# Patient Record
Sex: FEMALE | Race: WHITE | NOT HISPANIC OR LATINO | URBAN - METROPOLITAN AREA
[De-identification: names, ages, dates, MRNs, and addresses within clinical notes are randomized per-mention and may not be internally consistent; named-entity substitution may affect disease eponyms.]

---

## 2023-07-18 ENCOUNTER — TELEPHONE (OUTPATIENT)
Dept: PRIMARY CARE | Facility: CLINIC | Age: 58
End: 2023-07-18
Payer: COMMERCIAL

## 2023-07-18 DIAGNOSIS — R10.84 GENERALIZED ABDOMINAL PAIN: Primary | ICD-10-CM

## 2024-03-07 ENCOUNTER — TELEPHONE (OUTPATIENT)
Dept: PRIMARY CARE | Facility: CLINIC | Age: 59
End: 2024-03-07
Payer: COMMERCIAL

## 2024-03-07 DIAGNOSIS — Z12.31 ENCOUNTER FOR SCREENING MAMMOGRAM FOR MALIGNANT NEOPLASM OF BREAST: Primary | ICD-10-CM

## 2024-03-15 ENCOUNTER — TELEPHONE (OUTPATIENT)
Dept: GASTROENTEROLOGY | Facility: CLINIC | Age: 59
End: 2024-03-15
Payer: COMMERCIAL

## 2024-03-15 NOTE — TELEPHONE ENCOUNTER
Attempted to speak with patient in regards to rescheduling office visit. VM left for patient to return call to office at earliest convenience.

## 2024-03-18 ENCOUNTER — TELEPHONE (OUTPATIENT)
Dept: GASTROENTEROLOGY | Facility: CLINIC | Age: 59
End: 2024-03-18
Payer: COMMERCIAL

## 2024-03-18 NOTE — TELEPHONE ENCOUNTER
Attempted to speak with patient in regards to rescheduling clinic appointment. VM left for patient to return call to office at earliest convenience.

## 2024-03-27 ENCOUNTER — APPOINTMENT (OUTPATIENT)
Dept: RADIOLOGY | Facility: CLINIC | Age: 59
End: 2024-03-27
Payer: COMMERCIAL

## 2024-03-27 ENCOUNTER — HOSPITAL ENCOUNTER (OUTPATIENT)
Dept: RADIOLOGY | Facility: CLINIC | Age: 59
Discharge: HOME | End: 2024-03-27
Payer: COMMERCIAL

## 2024-03-27 VITALS — BODY MASS INDEX: 23 KG/M2 | HEIGHT: 62 IN | WEIGHT: 125 LBS

## 2024-03-27 DIAGNOSIS — Z12.31 ENCOUNTER FOR SCREENING MAMMOGRAM FOR MALIGNANT NEOPLASM OF BREAST: ICD-10-CM

## 2024-03-27 DIAGNOSIS — Z12.31 SCREENING MAMMOGRAM FOR BREAST CANCER: ICD-10-CM

## 2024-03-27 PROCEDURE — 77063 BREAST TOMOSYNTHESIS BI: CPT

## 2024-03-27 PROCEDURE — 77063 BREAST TOMOSYNTHESIS BI: CPT | Performed by: RADIOLOGY

## 2024-03-27 PROCEDURE — 77067 SCR MAMMO BI INCL CAD: CPT | Performed by: RADIOLOGY

## 2024-03-27 NOTE — PROGRESS NOTES
Subjective   Patient ID: Sarthak Meneses is a 58 y.o. female who presents for Constipation (Pt states having chronic constipation for the last 20 + years. Experiences a lot of bloating, states that it takes a lot of time to struggle to have bowel movement. Has tried OTC medications with no relief. ).  HPI  Patient with longstanding depression and on multiple medications.  Also has longstanding issues with constipation which was previously treated with high-dose with over-the-counter laxative therapies.  She is seen in the office today to establish care for constipation management.  Last colonoscopy was more than 3 years ago it was a poor prep was recommended repeat in 1 year.  BM: once in 2 weeks if she does not take laxatives.   > 20 years.   Has been taking Stimulant laxatives >> for a long period of time.   She weaned o  She is on Many anti-depressants.   Current Outpatient Medications on File Prior to Visit   Medication Sig Dispense Refill    amitriptyline (Elavil) 150 mg tablet Take 1 tablet (150 mg) by mouth once daily at bedtime.      ammonium lactate (Amlactin) 12 % cream Apply topically.      bimatoprost (Latisse) 0.03 % ophthalmic solution PLACE 1 DROP ON APPLICATOR & APPLY EVENLY ALONG BOTH OF THE UPPER EYELIDS AT BASE OF EYELASHES DAILY      estradiol (Estrace) 1 mg tablet Take 1 tablet (1 mg) by mouth once daily.      levothyroxine (Synthroid, Levoxyl) 50 mcg tablet 1 tablet (50 mcg).      LORazepam (Ativan) 1 mg tablet Take 1 tablet (1 mg) by mouth once daily at bedtime.      minoxidil (Loniten) 2.5 mg tablet Take 0.5 tablets (1.25 mg) by mouth once daily.      nortriptyline (Pamelor) 75 mg capsule Take 2 capsules (150 mg) by mouth once daily at bedtime.      progesterone (Prometrium) 100 mg capsule       spironolactone (Aldactone) 100 mg tablet 200825 Medication spironolactone 100 mg tablet Aldactone 100 mg 3/3/2021 Active (Outside)      tranexamic acid (Lysteda) 650 mg tablet tablet 1 TAB  "DAILY UNTIL MELASMA IMPROVED THEN EVERY YEAR MAY-SEPTEMBER AND DURING JEFFRY VACATION       No current facility-administered medications on file prior to visit.        Review of Systems   Constitutional: Negative.    Respiratory: Negative.     Cardiovascular: Negative.    Gastrointestinal:  Positive for constipation.   Endocrine: Negative.    Genitourinary: Negative.    Skin: Negative.    Neurological: Negative.        Objective   Physical Exam  Vitals reviewed.   Constitutional:       General: She is awake.      Appearance: Normal appearance.   HENT:      Head: Normocephalic and atraumatic.      Nose: Nose normal.      Mouth/Throat:      Mouth: Mucous membranes are moist.   Eyes:      Pupils: Pupils are equal, round, and reactive to light.   Cardiovascular:      Rate and Rhythm: Normal rate.   Pulmonary:      Effort: Pulmonary effort is normal.   Neurological:      Mental Status: She is alert and oriented to person, place, and time. Mental status is at baseline.   Psychiatric:         Attention and Perception: Attention and perception normal.         Mood and Affect: Mood normal.         Behavior: Behavior normal.       /86 (BP Location: Right arm, Patient Position: Sitting, BP Cuff Size: Adult)   Pulse 88   Resp 16   Ht 1.575 m (5' 2\")   Wt 56.4 kg (124 lb 4.8 oz)   SpO2 98%   BMI 22.73 kg/m²      Lab Results   Component Value Date    WBC 3.5 (L) 11/25/2020    HGB 13.5 11/25/2020    HCT 42.0 11/25/2020    MCV 98 11/25/2020     11/25/2020           No lab exists for component: \"LABALBU\"    No results found for: \"AFP\"  Lab Results   Component Value Date    TSH 2.04 09/25/2021     Colonoscopy  Order# [id number]  Reading physician: Barry Kong MD Ordering physician: David Youssef DO Study date: 01/06/2023     Result Information    Status: Edited Result - FINAL (Collected: 1/7/2021 14:03) Provider Status: Ordered     Result Text    Result Text   Patient Name: Sarthak Meneses  Procedure Date: 1/7/2021 " 2:03 PM  MRN: [id number]  Account Number: [id number]  YOB: 1965  Admit Type: Outpatient  Site: [facility] Room 2  Ethnicity: Not  or   Race: White  Attending MD: Barry Kong MD, [id number]  Procedure:             Colonoscopy  Indications:           Screening for malignant neoplasm in the colon, This is                          the patient's first colonoscopy  Providers:             Barry Kong MD (Doctor) Gastroenterology,                          Leighton Mortensen (Nurse) , Jennifer Yan, Technician  Referring:             David Youssef DO  Medicines:             Propofol per Anesthesia  Complications:         No immediate complications. Estimated blood loss: None.  Procedure:             Pre-Anesthesia Assessment:                         - Prior to the procedure, a History and Physical was                          performed, and patient medications and allergies were                          reviewed. The patient is competent. The risks and                          benefits of the procedure and the sedation options and                          risks were discussed with the patient. All questions                          were answered and informed consent was obtained.                          Patient identification and proposed procedure were                          verified by the physician, the nurse, the anesthetist                          and the technician in the pre-procedure area in the                          procedure room. Mental Status Examination: alert and                          oriented. Airway Examination: normal oropharyngeal                          airway and neck mobility. Respiratory Examination:                          clear to auscultation. CV Examination: normal.                          Prophylactic Antibiotics: The patient does not require                          prophylactic antibiotics. Prior Anticoagulants: The                           patient has taken no previous anticoagulant or                          antiplatelet agents. ASA Grade Assessment: II - A                          patient with mild systemic disease. After reviewing                          the risks and benefits, the patient was deemed in                          satisfactory condition to undergo the procedure. The                          anesthesia plan was to use monitored anesthesia care                          (MAC). Immediately prior to administration of                          medications, the patient was re-assessed for adequacy                          to receive sedatives. The heart rate, respiratory                          rate, oxygen saturations, blood pressure, adequacy of                          pulmonary ventilation, and response to care were                          monitored throughout the procedure. The physical                          status of the patient was re-assessed after the                          procedure.                         After I obtained informed consent, the scope was                          passed under direct vision. Throughout the procedure,                          the patient's blood pressure, pulse, and oxygen                          saturations were monitored continuously. The pediatric                          colonoscope was introduced through the anus and                          advanced to the transverse colon. The colonoscopy was                          performed with ease. The patient tolerated the                          procedure well. The quality of the bowel preparation                          was evaluated using the BBPS ([place] Bowel Preparation                          Scale) with scores of: Right Colon = 0 (unprepared,                          mucosa not seen due to solid stool that cannot be                          cleared or unseen proximal colon segment in a                          colonoscopy aborted due  to inadequate bowel prep),                          Transverse Colon = 1 (portion of mucosa seen, but                          other areas not well seen due to staining, residual                          stool and/or opaque liquid) and Left Colon = 1                          (portion of mucosa seen, but other areas not well seen                          due to staining, residual stool and/or opaque liquid).                          The total BBPS score equals 2. The quality of the                          bowel preparation was inadequate. The rectum was                          photographed.  Findings:       The perianal and digital rectal examinations were normal.       Copious quantities of semi-solid stool was found in the entire colon,        precluding visualization. Lavage of the area was performed using copious        amounts of tap water, resulting in incomplete clearance with continued        poor visualization.       The exam was otherwise normal throughout the examined colon.  Estimated Blood Loss:       Estimated blood loss: none.  Impression:            - Preparation of the colon was inadequate.                         - Stool in the entire examined colon.                         - Otherwise normal limited colonic examination.  Recommendation:        - Discharge patient to home.                         - Resume previous diet.                         - Continue present medications.                         - Repeat colonoscopy in 1 year because the bowel                          preparation was poor using a two day preparation.                         - Return to referring physician.                         - Patient has a contact number available for                          emergencies. The signs and symptoms of potential                          delayed complications were discussed with the patient.                          Return to normal activities tomorrow. Written                           discharge instructions were provided to the patient.  Procedure Code(s):     --- Professional ---                         28301, 53, Colonoscopy, flexible; diagnostic,                          including collection of specimen(s) by brushing or                          washing, when performed (separate procedure)  Diagnosis Code(s):     --- Professional ---                         Z12.11, Encounter for screening for malignant neoplasm                          of colon  CPT copyright 2021 [employer]. All rights reserved.  The codes documented in this report are preliminary and upon  review may   be revised to meet current compliance requirements.  Barry Kong MD  1/7/2021 2:34:19 PM  This report has been signed electronically.  Number of Addenda: 0  Note Initiated On: 1/7/2021 2:03 PM  Total Procedure Duration Time 0 hours 12 minutes 24 second           Assessment/Plan   Diagnoses and all orders for this visit:  Chronic idiopathic constipation  -     lubiprostone (Amitiza) 24 mcg capsule; Take 1 capsule (24 mcg) by mouth 2 times a day with meals. Take with meals  Colon cancer screening  -     Colonoscopy Screening; Average Risk Patient; Future  -     sodium,potassium,mag sulfates (Suprep) 17.5-3.13-1.6 gram recon soln solution; Take one bottle twice as directed by the prep instructions  2 day prep, day 1 miralax prep , day 2 Suprep.     Continue Miralax 1 capful in 1-2 times a day.   High fiber diet.   Follow up in 3 months.

## 2024-04-03 ENCOUNTER — OFFICE VISIT (OUTPATIENT)
Dept: GASTROENTEROLOGY | Facility: CLINIC | Age: 59
End: 2024-04-03
Payer: COMMERCIAL

## 2024-04-03 VITALS
RESPIRATION RATE: 16 BRPM | HEART RATE: 88 BPM | WEIGHT: 124.3 LBS | SYSTOLIC BLOOD PRESSURE: 126 MMHG | BODY MASS INDEX: 22.87 KG/M2 | DIASTOLIC BLOOD PRESSURE: 86 MMHG | HEIGHT: 62 IN | OXYGEN SATURATION: 98 %

## 2024-04-03 DIAGNOSIS — K59.04 CHRONIC IDIOPATHIC CONSTIPATION: Primary | ICD-10-CM

## 2024-04-03 DIAGNOSIS — Z12.11 COLON CANCER SCREENING: ICD-10-CM

## 2024-04-03 PROCEDURE — 1036F TOBACCO NON-USER: CPT | Performed by: INTERNAL MEDICINE

## 2024-04-03 PROCEDURE — 99204 OFFICE O/P NEW MOD 45 MIN: CPT | Performed by: INTERNAL MEDICINE

## 2024-04-03 RX ORDER — SPIRONOLACTONE 100 MG/1
TABLET, FILM COATED ORAL
COMMUNITY
Start: 2014-09-09

## 2024-04-03 RX ORDER — AMITRIPTYLINE HYDROCHLORIDE 150 MG/1
150 TABLET ORAL NIGHTLY
COMMUNITY

## 2024-04-03 RX ORDER — ESTRADIOL 1 MG/1
1 TABLET ORAL DAILY
COMMUNITY
Start: 2024-02-20

## 2024-04-03 RX ORDER — TRANEXAMIC ACID 650 MG/1
TABLET ORAL
COMMUNITY
Start: 2024-03-21

## 2024-04-03 RX ORDER — LUBIPROSTONE 24 UG/1
24 CAPSULE ORAL
Qty: 60 CAPSULE | Refills: 11 | Status: SHIPPED | OUTPATIENT
Start: 2024-04-03 | End: 2025-04-03

## 2024-04-03 RX ORDER — AMMONIUM LACTATE 12 G/100G
CREAM TOPICAL
COMMUNITY
Start: 2017-12-05

## 2024-04-03 RX ORDER — SODIUM, POTASSIUM,MAG SULFATES 17.5-3.13G
SOLUTION, RECONSTITUTED, ORAL ORAL
Qty: 354 ML | Refills: 0 | Status: SHIPPED | OUTPATIENT
Start: 2024-04-03

## 2024-04-03 RX ORDER — BIMATOPROST 3 UG/ML
SOLUTION TOPICAL
COMMUNITY

## 2024-04-03 RX ORDER — MINOXIDIL 2.5 MG/1
1.25 TABLET ORAL DAILY
COMMUNITY

## 2024-04-03 RX ORDER — NORTRIPTYLINE HYDROCHLORIDE 75 MG/1
150 CAPSULE ORAL NIGHTLY
COMMUNITY

## 2024-04-03 RX ORDER — PROGESTERONE 100 MG/1
CAPSULE ORAL
COMMUNITY
Start: 2024-03-29

## 2024-04-03 RX ORDER — LORAZEPAM 1 MG/1
1 TABLET ORAL NIGHTLY
COMMUNITY

## 2024-04-03 RX ORDER — LEVOTHYROXINE SODIUM 50 UG/1
50 TABLET ORAL
COMMUNITY
Start: 2022-03-19

## 2024-04-03 ASSESSMENT — ENCOUNTER SYMPTOMS
CONSTIPATION: 1
NEUROLOGICAL NEGATIVE: 1
RESPIRATORY NEGATIVE: 1
CARDIOVASCULAR NEGATIVE: 1
ENDOCRINE NEGATIVE: 1
CONSTITUTIONAL NEGATIVE: 1

## 2024-04-03 NOTE — PATIENT INSTRUCTIONS
Chronic idiopathic constipation  -     lubiprostone (Amitiza) 24 mcg capsule; Take 1 capsule (24 mcg) by mouth 2 times a day with meals. Take with meals  Colon cancer screening  -     Colonoscopy Screening; Average Risk Patient; Future  -     sodium,potassium,mag sulfates (Suprep) 17.5-3.13-1.6 gram recon soln solution; Take one bottle twice as directed by the prep instructions  2 day prep, day 1 miralax prep , day 2 Suprep.     Continue Miralax 1 capful in 1-2 times a day.   High fiber diet.   Follow up in 3 months.

## 2024-04-04 ENCOUNTER — APPOINTMENT (OUTPATIENT)
Dept: GASTROENTEROLOGY | Facility: CLINIC | Age: 59
End: 2024-04-04
Payer: COMMERCIAL

## 2024-04-05 ENCOUNTER — TELEPHONE (OUTPATIENT)
Dept: PRIMARY CARE | Facility: CLINIC | Age: 59
End: 2024-04-05
Payer: COMMERCIAL

## 2024-04-05 ENCOUNTER — PATIENT MESSAGE (OUTPATIENT)
Dept: PRIMARY CARE | Facility: CLINIC | Age: 59
End: 2024-04-05
Payer: COMMERCIAL

## 2024-04-05 DIAGNOSIS — R92.333 HETEROGENEOUSLY DENSE TISSUE OF BOTH BREASTS ON MAMMOGRAPHY: Primary | ICD-10-CM

## 2024-04-05 NOTE — TELEPHONE ENCOUNTER
----- Message from David Youssef DO sent at 4/4/2024  8:04 PM EDT -----  Call Sarthak Meneses their mammogram is normal

## 2024-04-23 ENCOUNTER — APPOINTMENT (OUTPATIENT)
Dept: RADIOLOGY | Facility: HOSPITAL | Age: 59
End: 2024-04-23
Payer: COMMERCIAL

## 2024-05-13 ENCOUNTER — HOSPITAL ENCOUNTER (OUTPATIENT)
Dept: RADIOLOGY | Facility: HOSPITAL | Age: 59
Discharge: HOME | End: 2024-05-13
Payer: COMMERCIAL

## 2024-05-13 VITALS — WEIGHT: 126 LBS | HEIGHT: 62 IN | BODY MASS INDEX: 23.19 KG/M2

## 2024-05-13 DIAGNOSIS — R92.333 HETEROGENEOUSLY DENSE TISSUE OF BOTH BREASTS ON MAMMOGRAPHY: ICD-10-CM

## 2024-05-13 PROCEDURE — 76642 ULTRASOUND BREAST LIMITED: CPT | Mod: BILATERAL PROCEDURE | Performed by: RADIOLOGY

## 2024-05-13 PROCEDURE — 76641 ULTRASOUND BREAST COMPLETE: CPT | Mod: 50

## 2024-05-14 ENCOUNTER — TELEPHONE (OUTPATIENT)
Dept: PRIMARY CARE | Facility: CLINIC | Age: 59
End: 2024-05-14
Payer: COMMERCIAL

## 2024-05-14 NOTE — TELEPHONE ENCOUNTER
----- Message from David Youssef DO sent at 5/14/2024  7:35 AM EDT -----  Call Sarthak Meneses their mammogram is normal    
Sent message in [place]  
No

## 2024-06-12 ENCOUNTER — ANESTHESIA EVENT (OUTPATIENT)
Dept: GASTROENTEROLOGY | Facility: EXTERNAL LOCATION | Age: 59
End: 2024-06-12

## 2024-06-24 ENCOUNTER — APPOINTMENT (OUTPATIENT)
Dept: GASTROENTEROLOGY | Facility: EXTERNAL LOCATION | Age: 59
End: 2024-06-24
Payer: COMMERCIAL

## 2024-06-24 ENCOUNTER — ANESTHESIA (OUTPATIENT)
Dept: GASTROENTEROLOGY | Facility: EXTERNAL LOCATION | Age: 59
End: 2024-06-24

## 2024-07-09 NOTE — PROGRESS NOTES
Subjective   Patient ID: Sarthak Meneses is a 59 y.o. female who presents for Follow-up (Patient follows up for constipation. She states she tends to take the amitiza later in the day since she doesn't tend to eat breakfast. /Denies rectal bleeding/mucus. /).  HPI  Is on Amitiza twice daily.   BM : once in 2 days.   Overall symptoms have improved from before.    Current Outpatient Medications on File Prior to Visit   Medication Sig Dispense Refill    amitriptyline (Elavil) 150 mg tablet Take 1 tablet (150 mg) by mouth once daily at bedtime.      bimatoprost (Latisse) 0.03 % ophthalmic solution PLACE 1 DROP ON APPLICATOR & APPLY EVENLY ALONG BOTH OF THE UPPER EYELIDS AT BASE OF EYELASHES DAILY      estradiol (Estrace) 1 mg tablet Take 1 tablet (1 mg) by mouth once daily.      levothyroxine (Synthroid, Levoxyl) 50 mcg tablet 1 tablet (50 mcg).      LORazepam (Ativan) 1 mg tablet Take 1 tablet (1 mg) by mouth once daily at bedtime.      lubiprostone (Amitiza) 24 mcg capsule Take 1 capsule (24 mcg) by mouth 2 times a day with meals. Take with meals 60 capsule 11    minoxidil (Loniten) 2.5 mg tablet Take 0.5 tablets (1.25 mg) by mouth once daily.      nortriptyline (Pamelor) 75 mg capsule Take 2 capsules (150 mg) by mouth once daily at bedtime.      progesterone (Prometrium) 100 mg capsule       sodium,potassium,mag sulfates (Suprep) 17.5-3.13-1.6 gram recon soln solution Take one bottle twice as directed by the prep instructions 354 mL 0    spironolactone (Aldactone) 100 mg tablet 200825 Medication spironolactone 100 mg tablet Aldactone 100 mg 3/3/2021 Active (Outside)      tranexamic acid (Lysteda) 650 mg tablet tablet 1 TAB DAILY UNTIL MELASMA IMPROVED THEN EVERY YEAR MAY-SEPTEMBER AND DURING JEFFRY VACATION      [DISCONTINUED] ammonium lactate (Amlactin) 12 % cream Apply topically.       No current facility-administered medications on file prior to visit.        Review of Systems   Constitutional:  Negative for  chills, fever and unexpected weight change.   HENT:  Negative for congestion and trouble swallowing.    Respiratory:  Negative for cough, shortness of breath and wheezing.    Cardiovascular:  Negative for chest pain.   Gastrointestinal:  Negative for abdominal distention, abdominal pain, constipation, diarrhea, nausea and vomiting.   Genitourinary:  Negative for difficulty urinating.   Musculoskeletal:  Negative for arthralgias and joint swelling.   Skin:  Negative for color change.   Neurological:  Negative for dizziness, speech difficulty, light-headedness and headaches.   Psychiatric/Behavioral:  Negative for confusion and sleep disturbance.        Objective   Physical Exam  Constitutional:       General: She is awake.      Appearance: Normal appearance.   HENT:      Head: Normocephalic and atraumatic.      Nose: Nose normal.      Mouth/Throat:      Mouth: Mucous membranes are moist.   Eyes:      Pupils: Pupils are equal, round, and reactive to light.   Neck:      Thyroid: No thyroid mass.      Trachea: Phonation normal.   Cardiovascular:      Rate and Rhythm: Normal rate and regular rhythm.      Heart sounds: Normal heart sounds. No murmur heard.     No gallop.   Pulmonary:      Effort: Pulmonary effort is normal. No respiratory distress.      Breath sounds: Normal air entry. No decreased breath sounds, wheezing, rhonchi or rales.   Abdominal:      General: Bowel sounds are normal. There is no distension.      Palpations: Abdomen is soft.      Tenderness: There is no abdominal tenderness.   Musculoskeletal:      Cervical back: Neck supple.      Right lower leg: No edema.      Left lower leg: No edema.   Skin:     General: Skin is warm.      Capillary Refill: Capillary refill takes less than 2 seconds.   Neurological:      General: No focal deficit present.      Mental Status: She is alert and oriented to person, place, and time. Mental status is at baseline.      Cranial Nerves: Cranial nerves 2-12 are intact.  "     Motor: Motor function is intact.   Psychiatric:         Attention and Perception: Attention and perception normal.         Mood and Affect: Mood normal.         Speech: Speech normal.         Behavior: Behavior normal.       /71 (BP Location: Left arm, Patient Position: Sitting, BP Cuff Size: Small adult)   Pulse 87   Temp 36.6 °C (97.9 °F) (Temporal)   Ht 1.575 m (5' 2\")   Wt 54.5 kg (120 lb 1.6 oz)   SpO2 99%   BMI 21.97 kg/m²      Lab Results   Component Value Date    WBC 3.5 (L) 11/25/2020    HGB 13.5 11/25/2020    HCT 42.0 11/25/2020    MCV 98 11/25/2020     11/25/2020           No lab exists for component: \"LABALBU\"    No results found for: \"AFP\"  Lab Results   Component Value Date    TSH 2.04 09/25/2021     Colonoscopy  Order# [id number]  Reading physician: Barry Kong MD Ordering provider: David Youssef DO Study date: 01/06/2023     Result Information    Status: Edited Result - FINAL (Collected: 1/7/2021 14:03) Provider Status: Ordered     Result Text    Result Text   Patient Name: Sarthak eMneses  Procedure Date: 1/7/2021 2:03 PM  MRN: [id number]  Account Number: [id number]  YOB: 1965  Admit Type: Outpatient  Site: [facility] Room 2  Ethnicity: Not  or   Race: White  Attending MD: Barry Kong MD, [id number]  Procedure:             Colonoscopy  Indications:           Screening for malignant neoplasm in the colon, This is                          the patient's first colonoscopy  Providers:             Barry Kong MD (Doctor) Gastroenterology,                          Leighton Mortensen (Nurse) , Jennifer Yan, Technician  Referring:             David Youssef DO  Medicines:             Propofol per Anesthesia  Complications:         No immediate complications. Estimated blood loss: None.  Procedure:             Pre-Anesthesia Assessment:                         - Prior to the procedure, a History and Physical was                          " performed, and patient medications and allergies were                          reviewed. The patient is competent. The risks and                          benefits of the procedure and the sedation options and                          risks were discussed with the patient. All questions                          were answered and informed consent was obtained.                          Patient identification and proposed procedure were                          verified by the physician, the nurse, the anesthetist                          and the technician in the pre-procedure area in the                          procedure room. Mental Status Examination: alert and                          oriented. Airway Examination: normal oropharyngeal                          airway and neck mobility. Respiratory Examination:                          clear to auscultation. CV Examination: normal.                          Prophylactic Antibiotics: The patient does not require                          prophylactic antibiotics. Prior Anticoagulants: The                          patient has taken no previous anticoagulant or                          antiplatelet agents. ASA Grade Assessment: II - A                          patient with mild systemic disease. After reviewing                          the risks and benefits, the patient was deemed in                          satisfactory condition to undergo the procedure. The                          anesthesia plan was to use monitored anesthesia care                          (MAC). Immediately prior to administration of                          medications, the patient was re-assessed for adequacy                          to receive sedatives. The heart rate, respiratory                          rate, oxygen saturations, blood pressure, adequacy of                          pulmonary ventilation, and response to care were                          monitored throughout the procedure. The  physical                          status of the patient was re-assessed after the                          procedure.                         After I obtained informed consent, the scope was                          passed under direct vision. Throughout the procedure,                          the patient's blood pressure, pulse, and oxygen                          saturations were monitored continuously. The pediatric                          colonoscope was introduced through the anus and                          advanced to the transverse colon. The colonoscopy was                          performed with ease. The patient tolerated the                          procedure well. The quality of the bowel preparation                          was evaluated using the BBPS ([place] Bowel Preparation                          Scale) with scores of: Right Colon = 0 (unprepared,                          mucosa not seen due to solid stool that cannot be                          cleared or unseen proximal colon segment in a                          colonoscopy aborted due to inadequate bowel prep),                          Transverse Colon = 1 (portion of mucosa seen, but                          other areas not well seen due to staining, residual                          stool and/or opaque liquid) and Left Colon = 1                          (portion of mucosa seen, but other areas not well seen                          due to staining, residual stool and/or opaque liquid).                          The total BBPS score equals 2. The quality of the                          bowel preparation was inadequate. The rectum was                          photographed.  Findings:       The perianal and digital rectal examinations were normal.       Copious quantities of semi-solid stool was found in the entire colon,        precluding visualization. Lavage of the area was performed using copious        amounts of tap water, resulting in  incomplete clearance with continued        poor visualization.       The exam was otherwise normal throughout the examined colon.  Estimated Blood Loss:       Estimated blood loss: none.  Impression:            - Preparation of the colon was inadequate.                         - Stool in the entire examined colon.                         - Otherwise normal limited colonic examination.  Recommendation:        - Discharge patient to home.                         - Resume previous diet.                         - Continue present medications.                         - Repeat colonoscopy in 1 year because the bowel                          preparation was poor using a two day preparation.                         - Return to referring physician.                         - Patient has a contact number available for                          emergencies. The signs and symptoms of potential                          delayed complications were discussed with the patient.                          Return to normal activities tomorrow. Written                          discharge instructions were provided to the patient.  Procedure Code(s):     --- Professional ---                         31962, 53, Colonoscopy, flexible; diagnostic,                          including collection of specimen(s) by brushing or                          washing, when performed (separate procedure)  Diagnosis Code(s):     --- Professional ---                         Z12.11, Encounter for screening for malignant neoplasm                          of colon  CPT copyright 2021 [employer]. All rights reserved.  The codes documented in this report are preliminary and upon  review may   be revised to meet current compliance requirements.  Barry Kong MD  1/7/2021 2:34:19 PM  This report has been signed electronically.  Number of Addenda: 0  Note Initiated On: 1/7/2021 2:03 PM  Total Procedure Duration Time 0 hours 12 minutes 24  seconds             Assessment/Plan   Diagnoses and all orders for this visit:  Chronic idiopathic constipation  Continue Amitiza twice daily.   Complete colonoscopy  Follow up in 1 year or earlier as needed.   Call with questions.

## 2024-07-11 ENCOUNTER — APPOINTMENT (OUTPATIENT)
Dept: GASTROENTEROLOGY | Facility: CLINIC | Age: 59
End: 2024-07-11
Payer: COMMERCIAL

## 2024-07-11 VITALS
OXYGEN SATURATION: 99 % | HEIGHT: 62 IN | BODY MASS INDEX: 22.1 KG/M2 | HEART RATE: 87 BPM | TEMPERATURE: 97.9 F | DIASTOLIC BLOOD PRESSURE: 71 MMHG | WEIGHT: 120.1 LBS | SYSTOLIC BLOOD PRESSURE: 113 MMHG

## 2024-07-11 DIAGNOSIS — K59.04 CHRONIC IDIOPATHIC CONSTIPATION: Primary | ICD-10-CM

## 2024-07-11 PROCEDURE — 3008F BODY MASS INDEX DOCD: CPT | Performed by: INTERNAL MEDICINE

## 2024-07-11 PROCEDURE — 99214 OFFICE O/P EST MOD 30 MIN: CPT | Performed by: INTERNAL MEDICINE

## 2024-07-11 ASSESSMENT — ENCOUNTER SYMPTOMS
JOINT SWELLING: 0
ARTHRALGIAS: 0
SPEECH DIFFICULTY: 0
ABDOMINAL DISTENTION: 0
CONFUSION: 0
COUGH: 0
SLEEP DISTURBANCE: 0
ABDOMINAL PAIN: 0
DIZZINESS: 0
CONSTIPATION: 0
VOMITING: 0
FEVER: 0
NAUSEA: 0
DIFFICULTY URINATING: 0
HEADACHES: 0
CHILLS: 0
UNEXPECTED WEIGHT CHANGE: 0
LIGHT-HEADEDNESS: 0
COLOR CHANGE: 0
SHORTNESS OF BREATH: 0
WHEEZING: 0
TROUBLE SWALLOWING: 0
DIARRHEA: 0

## 2024-07-11 NOTE — PATIENT INSTRUCTIONS
Diagnoses and all orders for this visit:  Chronic idiopathic constipation  Continue Amitiza twice daily.   Complete colonoscopy  Follow up in 1 year or earlier as needed.   Call with questions.

## 2024-07-31 ENCOUNTER — LAB (OUTPATIENT)
Dept: LAB | Facility: LAB | Age: 59
End: 2024-07-31
Payer: COMMERCIAL

## 2024-07-31 ENCOUNTER — APPOINTMENT (OUTPATIENT)
Dept: PRIMARY CARE | Facility: CLINIC | Age: 59
End: 2024-07-31
Payer: COMMERCIAL

## 2024-07-31 VITALS
DIASTOLIC BLOOD PRESSURE: 72 MMHG | TEMPERATURE: 97.1 F | RESPIRATION RATE: 16 BRPM | SYSTOLIC BLOOD PRESSURE: 114 MMHG | HEART RATE: 84 BPM | HEIGHT: 62 IN | BODY MASS INDEX: 22.08 KG/M2 | WEIGHT: 120 LBS

## 2024-07-31 DIAGNOSIS — Z00.00 WELL ADULT EXAM: Primary | ICD-10-CM

## 2024-07-31 DIAGNOSIS — E03.9 HYPOTHYROIDISM, UNSPECIFIED TYPE: ICD-10-CM

## 2024-07-31 DIAGNOSIS — L85.3 XEROSIS CUTIS: ICD-10-CM

## 2024-07-31 DIAGNOSIS — F41.9 ANXIETY: ICD-10-CM

## 2024-07-31 DIAGNOSIS — H81.09 MENIERE'S DISEASE, UNSPECIFIED LATERALITY: ICD-10-CM

## 2024-07-31 DIAGNOSIS — Z13.220 LIPID SCREENING: ICD-10-CM

## 2024-07-31 DIAGNOSIS — Z00.00 WELL ADULT EXAM: ICD-10-CM

## 2024-07-31 DIAGNOSIS — L81.1 MELASMA: ICD-10-CM

## 2024-07-31 PROBLEM — H93.299 ABNORMAL AUDITORY PERCEPTION: Status: ACTIVE | Noted: 2024-07-31

## 2024-07-31 PROBLEM — D22.5 MELANOCYTIC NEVI OF TRUNK: Status: ACTIVE | Noted: 2021-03-03

## 2024-07-31 PROBLEM — D22.30 MELANOCYTIC NEVI OF UNSPECIFIED PART OF FACE: Status: ACTIVE | Noted: 2017-12-05

## 2024-07-31 PROBLEM — R05.9 COUGH: Status: ACTIVE | Noted: 2024-07-31

## 2024-07-31 PROBLEM — R92.333 MAMMOGRAPHIC HETEROGENEOUS DENSITY, BILATERAL BREASTS: Status: ACTIVE | Noted: 2024-07-31

## 2024-07-31 PROBLEM — K59.04 CHRONIC IDIOPATHIC CONSTIPATION: Status: ACTIVE | Noted: 2024-07-31

## 2024-07-31 PROBLEM — D18.01 HEMANGIOMA OF SKIN AND SUBCUTANEOUS TISSUE: Status: ACTIVE | Noted: 2021-03-03
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
## 2024-07-31 PROBLEM — H69.90 ETD (EUSTACHIAN TUBE DYSFUNCTION): Status: ACTIVE | Noted: 2024-07-31

## 2024-07-31 PROBLEM — J06.9 VIRAL UPPER RESPIRATORY TRACT INFECTION: Status: ACTIVE | Noted: 2024-07-31

## 2024-07-31 PROBLEM — H81.03 ENDOLYMPHATIC HYDROPS OF BOTH EARS: Status: ACTIVE | Noted: 2024-07-31

## 2024-07-31 PROBLEM — L82.1 OTHER SEBORRHEIC KERATOSIS: Status: ACTIVE | Noted: 2021-03-03

## 2024-07-31 PROBLEM — H92.09 OTALGIA: Status: ACTIVE | Noted: 2024-07-31

## 2024-07-31 PROBLEM — F32.A DEPRESSION: Status: ACTIVE | Noted: 2024-07-31

## 2024-07-31 PROBLEM — J30.9 ALLERGIC RHINITIS: Status: ACTIVE | Noted: 2024-07-31

## 2024-07-31 PROBLEM — L70.9 ACNE: Status: ACTIVE | Noted: 2021-03-03

## 2024-07-31 PROBLEM — L81.0 POSTINFLAMMATORY HYPERPIGMENTATION: Status: ACTIVE | Noted: 2021-03-03

## 2024-07-31 LAB
ALBUMIN SERPL BCP-MCNC: 4.7 G/DL (ref 3.4–5)
ALP SERPL-CCNC: 68 U/L (ref 33–110)
ALT SERPL W P-5'-P-CCNC: 24 U/L (ref 7–45)
ANION GAP SERPL CALC-SCNC: 11 MMOL/L (ref 10–20)
AST SERPL W P-5'-P-CCNC: 25 U/L (ref 9–39)
BASOPHILS # BLD AUTO: 0.04 X10*3/UL (ref 0–0.1)
BASOPHILS NFR BLD AUTO: 1.4 %
BILIRUB SERPL-MCNC: 0.3 MG/DL (ref 0–1.2)
BUN SERPL-MCNC: 15 MG/DL (ref 6–23)
CALCIUM SERPL-MCNC: 9.8 MG/DL (ref 8.6–10.3)
CHLORIDE SERPL-SCNC: 103 MMOL/L (ref 98–107)
CHOLEST SERPL-MCNC: 226 MG/DL (ref 0–199)
CHOLESTEROL/HDL RATIO: 2.6
CO2 SERPL-SCNC: 29 MMOL/L (ref 21–32)
CREAT SERPL-MCNC: 0.85 MG/DL (ref 0.5–1.05)
EGFRCR SERPLBLD CKD-EPI 2021: 80 ML/MIN/1.73M*2
EOSINOPHIL # BLD AUTO: 0.04 X10*3/UL (ref 0–0.7)
EOSINOPHIL NFR BLD AUTO: 1.4 %
ERYTHROCYTE [DISTWIDTH] IN BLOOD BY AUTOMATED COUNT: 12.1 % (ref 11.5–14.5)
EST. AVERAGE GLUCOSE BLD GHB EST-MCNC: 105 MG/DL
GLUCOSE SERPL-MCNC: 99 MG/DL (ref 74–99)
HBA1C MFR BLD: 5.3 %
HCT VFR BLD AUTO: 44.7 % (ref 36–46)
HDLC SERPL-MCNC: 86 MG/DL
HGB BLD-MCNC: 14.5 G/DL (ref 12–16)
IMM GRANULOCYTES # BLD AUTO: 0 X10*3/UL (ref 0–0.7)
IMM GRANULOCYTES NFR BLD AUTO: 0 % (ref 0–0.9)
LDLC SERPL CALC-MCNC: 129 MG/DL
LYMPHOCYTES # BLD AUTO: 1.07 X10*3/UL (ref 1.2–4.8)
LYMPHOCYTES NFR BLD AUTO: 38.6 %
MCH RBC QN AUTO: 31 PG (ref 26–34)
MCHC RBC AUTO-ENTMCNC: 32.4 G/DL (ref 32–36)
MCV RBC AUTO: 96 FL (ref 80–100)
MONOCYTES # BLD AUTO: 0.27 X10*3/UL (ref 0.1–1)
MONOCYTES NFR BLD AUTO: 9.7 %
NEUTROPHILS # BLD AUTO: 1.35 X10*3/UL (ref 1.2–7.7)
NEUTROPHILS NFR BLD AUTO: 48.9 %
NON HDL CHOLESTEROL: 140 MG/DL (ref 0–149)
NRBC BLD-RTO: 0 /100 WBCS (ref 0–0)
PLATELET # BLD AUTO: 262 X10*3/UL (ref 150–450)
POTASSIUM SERPL-SCNC: 4.7 MMOL/L (ref 3.5–5.3)
PROGEST SERPL-MCNC: 1.2 NG/ML
PROT SERPL-MCNC: 7 G/DL (ref 6.4–8.2)
RBC # BLD AUTO: 4.67 X10*6/UL (ref 4–5.2)
SODIUM SERPL-SCNC: 138 MMOL/L (ref 136–145)
T4 FREE SERPL-MCNC: 0.78 NG/DL (ref 0.61–1.12)
TRIGL SERPL-MCNC: 54 MG/DL (ref 0–149)
TSH SERPL-ACNC: 2.13 MIU/L (ref 0.44–3.98)
VLDL: 11 MG/DL (ref 0–40)
WBC # BLD AUTO: 2.8 X10*3/UL (ref 4.4–11.3)

## 2024-07-31 PROCEDURE — 80061 LIPID PANEL: CPT

## 2024-07-31 PROCEDURE — 84439 ASSAY OF FREE THYROXINE: CPT

## 2024-07-31 PROCEDURE — 84144 ASSAY OF PROGESTERONE: CPT

## 2024-07-31 PROCEDURE — 82672 ASSAY OF ESTROGEN: CPT

## 2024-07-31 PROCEDURE — 99396 PREV VISIT EST AGE 40-64: CPT | Performed by: FAMILY MEDICINE

## 2024-07-31 PROCEDURE — 85025 COMPLETE CBC W/AUTO DIFF WBC: CPT

## 2024-07-31 PROCEDURE — 36415 COLL VENOUS BLD VENIPUNCTURE: CPT

## 2024-07-31 PROCEDURE — 84443 ASSAY THYROID STIM HORMONE: CPT

## 2024-07-31 PROCEDURE — 83036 HEMOGLOBIN GLYCOSYLATED A1C: CPT

## 2024-07-31 PROCEDURE — 80053 COMPREHEN METABOLIC PANEL: CPT

## 2024-07-31 RX ORDER — TRANEXAMIC ACID 650 MG/1
650 TABLET ORAL DAILY
Qty: 90 TABLET | Refills: 3 | Status: SHIPPED | OUTPATIENT
Start: 2024-07-31 | End: 2025-07-31

## 2024-07-31 ASSESSMENT — ENCOUNTER SYMPTOMS
DIARRHEA: 0
VOMITING: 0
APPETITE CHANGE: 0
CHEST TIGHTNESS: 0
SHORTNESS OF BREATH: 0
POLYDIPSIA: 0
NAUSEA: 0
POLYPHAGIA: 0
ARTHRALGIAS: 0
FATIGUE: 0
NUMBNESS: 0
MYALGIAS: 0
DIZZINESS: 0
FREQUENCY: 0
HEADACHES: 0
WEAKNESS: 0

## 2024-07-31 ASSESSMENT — PATIENT HEALTH QUESTIONNAIRE - PHQ9
2. FEELING DOWN, DEPRESSED OR HOPELESS: NOT AT ALL
1. LITTLE INTEREST OR PLEASURE IN DOING THINGS: NOT AT ALL
SUM OF ALL RESPONSES TO PHQ9 QUESTIONS 1 AND 2: 0

## 2024-07-31 NOTE — PROGRESS NOTES
"Patient reported health Good    Regular Dental Visits yes    Regular Eye Visits yes    Hearing Loss no    Balanced Diet yes    Weight Concerns no    Exercise Regular     Subjective   Patient ID: Sarthak Meneses is a 59 y.o. female who presents for Annual Exam.    HPI     Review of Systems   Constitutional:  Negative for appetite change and fatigue.   Eyes:  Negative for visual disturbance.   Respiratory:  Negative for chest tightness and shortness of breath.    Cardiovascular:  Negative for chest pain and leg swelling.   Gastrointestinal:  Negative for diarrhea, nausea and vomiting.   Endocrine: Negative for polydipsia, polyphagia and polyuria.   Genitourinary:  Negative for frequency and urgency.   Musculoskeletal:  Negative for arthralgias and myalgias.   Neurological:  Negative for dizziness, syncope, weakness, numbness and headaches.       Objective   /72   Pulse 84   Temp 36.2 °C (97.1 °F)   Resp 16   Ht 1.575 m (5' 2\")   Wt 54.4 kg (120 lb)   BMI 21.95 kg/m²     Physical Exam  Constitutional:       Appearance: Normal appearance.   HENT:      Head: Normocephalic.      Right Ear: Tympanic membrane, ear canal and external ear normal.      Left Ear: Tympanic membrane, ear canal and external ear normal.      Nose: Nose normal.      Mouth/Throat:      Mouth: Mucous membranes are moist.      Pharynx: Oropharynx is clear.   Eyes:      Conjunctiva/sclera: Conjunctivae normal.   Cardiovascular:      Rate and Rhythm: Normal rate and regular rhythm.   Pulmonary:      Effort: Pulmonary effort is normal.      Breath sounds: Normal breath sounds.   Musculoskeletal:         General: Normal range of motion.      Cervical back: Neck supple.   Skin:     General: Skin is warm and dry.   Neurological:      General: No focal deficit present.      Mental Status: She is alert and oriented to person, place, and time.   Psychiatric:         Mood and Affect: Mood normal.         Assessment/Plan   Problem List Items " Addressed This Visit             ICD-10-CM    Xerosis cutis L85.3    Relevant Orders    Follow Up In [facility] - PCP    CBC and Auto Differential    Comprehensive Metabolic Panel    Hemoglobin A1C    Lipid Panel    Thyroid Stimulating Hormone    T4, free    Estrogens, Total    Progesterone    Anxiety F41.9    Relevant Orders    Follow Up In [facility] - PCP    CBC and Auto Differential    Comprehensive Metabolic Panel    Hemoglobin A1C    Lipid Panel    Thyroid Stimulating Hormone    T4, free    Estrogens, Total    Progesterone    Meniere disease H81.09    Relevant Orders    Follow Up In [facility] - PCP    CBC and Auto Differential    Comprehensive Metabolic Panel    Hemoglobin A1C    Lipid Panel    Thyroid Stimulating Hormone    T4, free    Estrogens, Total    Progesterone    Hypothyroidism E03.9    Relevant Orders    Follow Up In [facility] - PCP    CBC and Auto Differential    Comprehensive Metabolic Panel    Hemoglobin A1C    Lipid Panel    Thyroid Stimulating Hormone    T4, free    Estrogens, Total    Progesterone     Other Visit Diagnoses         Codes    Well adult exam    -  Primary Z00.00    Relevant Orders    Follow Up In [facility] - PCP    CBC and Auto Differential    Comprehensive Metabolic Panel    Hemoglobin A1C    Lipid Panel    Thyroid Stimulating Hormone    T4, free    Estrogens, Total    Progesterone    Lipid screening     Z13.220    Relevant Orders    Follow Up In [facility] - PCP    CBC and Auto Differential    Comprehensive Metabolic Panel    Hemoglobin A1C    Lipid Panel    Thyroid Stimulating Hormone    T4, free    Estrogens, Total    Progesterone    Melasma     L81.1    Relevant Medications    tranexamic acid (Lysteda) 650 mg tablet tablet

## 2024-08-06 ENCOUNTER — ANESTHESIA EVENT (OUTPATIENT)
Dept: GASTROENTEROLOGY | Facility: EXTERNAL LOCATION | Age: 59
End: 2024-08-06

## 2024-08-08 DIAGNOSIS — Z00.00 WELL ADULT EXAM: ICD-10-CM

## 2024-08-08 RX ORDER — PROGESTERONE 100 MG/1
100 CAPSULE ORAL DAILY
Qty: 30 CAPSULE | Refills: 3 | Status: SHIPPED | OUTPATIENT
Start: 2024-08-08 | End: 2024-08-09

## 2024-08-08 RX ORDER — ESTRADIOL 1 MG/1
1 TABLET ORAL DAILY
Qty: 30 TABLET | Refills: 3 | Status: SHIPPED | OUTPATIENT
Start: 2024-08-08

## 2024-08-08 NOTE — TELEPHONE ENCOUNTER
Patient requests prescription below    Last Office Visit: 2024   Next Office Visit: 2/3/2025     Requested Prescriptions     Pending Prescriptions Disp Refills    estradiol (Estrace) 1 mg tablet 30 tablet 1     Sig: Take 1 tablet (1 mg) by mouth once daily.    progesterone (Prometrium) 100 mg capsule 30 capsule 1     Si capsule (100 mg) once daily.

## 2024-08-09 RX ORDER — PROGESTERONE 100 MG/1
100 CAPSULE ORAL DAILY
Qty: 90 CAPSULE | Refills: 3 | Status: SHIPPED | OUTPATIENT
Start: 2024-08-09

## 2024-08-10 LAB — ESTROGEN SERPL-MCNC: 152 PG/ML (ref 40–244)

## 2024-08-14 ENCOUNTER — APPOINTMENT (OUTPATIENT)
Dept: GASTROENTEROLOGY | Facility: EXTERNAL LOCATION | Age: 59
End: 2024-08-14
Payer: COMMERCIAL

## 2024-08-16 ENCOUNTER — ANESTHESIA (OUTPATIENT)
Dept: GASTROENTEROLOGY | Facility: EXTERNAL LOCATION | Age: 59
End: 2024-08-16

## 2024-08-16 ENCOUNTER — APPOINTMENT (OUTPATIENT)
Dept: GASTROENTEROLOGY | Facility: EXTERNAL LOCATION | Age: 59
End: 2024-08-16
Payer: COMMERCIAL

## 2024-10-14 ENCOUNTER — ANESTHESIA EVENT (OUTPATIENT)
Dept: GASTROENTEROLOGY | Facility: EXTERNAL LOCATION | Age: 59
End: 2024-10-14

## 2024-10-25 ENCOUNTER — APPOINTMENT (OUTPATIENT)
Dept: GASTROENTEROLOGY | Facility: EXTERNAL LOCATION | Age: 59
End: 2024-10-25
Payer: COMMERCIAL

## 2024-10-25 ENCOUNTER — ANESTHESIA (OUTPATIENT)
Dept: GASTROENTEROLOGY | Facility: EXTERNAL LOCATION | Age: 59
End: 2024-10-25

## 2024-12-27 ENCOUNTER — PATIENT MESSAGE (OUTPATIENT)
Dept: PRIMARY CARE | Facility: CLINIC | Age: 59
End: 2024-12-27
Payer: COMMERCIAL

## 2024-12-27 DIAGNOSIS — D70.9 NEUTROPENIA, UNSPECIFIED TYPE (CMS-HCC): Primary | ICD-10-CM

## 2025-01-20 ENCOUNTER — LAB (OUTPATIENT)
Dept: LAB | Facility: LAB | Age: 60
End: 2025-01-20
Payer: COMMERCIAL

## 2025-01-20 DIAGNOSIS — F33.1 MAJOR DEPRESSIVE DISORDER, RECURRENT, MODERATE: ICD-10-CM

## 2025-01-20 DIAGNOSIS — F41.1 GENERALIZED ANXIETY DISORDER: ICD-10-CM

## 2025-01-20 DIAGNOSIS — Z79.899 OTHER LONG TERM (CURRENT) DRUG THERAPY: Primary | ICD-10-CM

## 2025-01-20 DIAGNOSIS — D70.9 NEUTROPENIA, UNSPECIFIED TYPE (CMS-HCC): ICD-10-CM

## 2025-01-20 LAB
BASOPHILS # BLD AUTO: 0.04 X10*3/UL (ref 0–0.1)
BASOPHILS NFR BLD AUTO: 1.2 %
EOSINOPHIL # BLD AUTO: 0.07 X10*3/UL (ref 0–0.7)
EOSINOPHIL NFR BLD AUTO: 2.2 %
ERYTHROCYTE [DISTWIDTH] IN BLOOD BY AUTOMATED COUNT: 12 % (ref 11.5–14.5)
ERYTHROCYTE [SEDIMENTATION RATE] IN BLOOD BY WESTERGREN METHOD: 3 MM/H (ref 0–30)
HCT VFR BLD AUTO: 39.7 % (ref 36–46)
HGB BLD-MCNC: 13.2 G/DL (ref 12–16)
IMM GRANULOCYTES # BLD AUTO: 0.01 X10*3/UL (ref 0–0.7)
IMM GRANULOCYTES NFR BLD AUTO: 0.3 % (ref 0–0.9)
LYMPHOCYTES # BLD AUTO: 1.07 X10*3/UL (ref 1.2–4.8)
LYMPHOCYTES NFR BLD AUTO: 33.2 %
MCH RBC QN AUTO: 31.4 PG (ref 26–34)
MCHC RBC AUTO-ENTMCNC: 33.2 G/DL (ref 32–36)
MCV RBC AUTO: 94 FL (ref 80–100)
MONOCYTES # BLD AUTO: 0.35 X10*3/UL (ref 0.1–1)
MONOCYTES NFR BLD AUTO: 10.9 %
NEUTROPHILS # BLD AUTO: 1.68 X10*3/UL (ref 1.2–7.7)
NEUTROPHILS NFR BLD AUTO: 52.2 %
NRBC BLD-RTO: 0 /100 WBCS (ref 0–0)
PLATELET # BLD AUTO: 245 X10*3/UL (ref 150–450)
RBC # BLD AUTO: 4.21 X10*6/UL (ref 4–5.2)
WBC # BLD AUTO: 3.2 X10*3/UL (ref 4.4–11.3)

## 2025-01-20 PROCEDURE — 83615 LACTATE (LD) (LDH) ENZYME: CPT

## 2025-01-20 PROCEDURE — 36415 COLL VENOUS BLD VENIPUNCTURE: CPT

## 2025-01-20 PROCEDURE — 80053 COMPREHEN METABOLIC PANEL: CPT

## 2025-01-20 PROCEDURE — 85025 COMPLETE CBC W/AUTO DIFF WBC: CPT

## 2025-01-20 PROCEDURE — 85652 RBC SED RATE AUTOMATED: CPT

## 2025-01-20 PROCEDURE — 80335 ANTIDEPRESSANT TRICYCLIC 1/2: CPT

## 2025-01-21 LAB
ALBUMIN SERPL BCP-MCNC: 4.2 G/DL (ref 3.4–5)
ALP SERPL-CCNC: 58 U/L (ref 33–110)
ALT SERPL W P-5'-P-CCNC: 17 U/L (ref 7–45)
ANION GAP SERPL CALC-SCNC: 9 MMOL/L (ref 10–20)
AST SERPL W P-5'-P-CCNC: 18 U/L (ref 9–39)
BILIRUB SERPL-MCNC: 0.4 MG/DL (ref 0–1.2)
BUN SERPL-MCNC: 15 MG/DL (ref 6–23)
CALCIUM SERPL-MCNC: 9.3 MG/DL (ref 8.6–10.3)
CHLORIDE SERPL-SCNC: 100 MMOL/L (ref 98–107)
CO2 SERPL-SCNC: 30 MMOL/L (ref 21–32)
CREAT SERPL-MCNC: 0.79 MG/DL (ref 0.5–1.05)
EGFRCR SERPLBLD CKD-EPI 2021: 87 ML/MIN/1.73M*2
GLUCOSE SERPL-MCNC: 85 MG/DL (ref 74–99)
LDH SERPL L TO P-CCNC: 124 U/L (ref 84–246)
POTASSIUM SERPL-SCNC: 4.6 MMOL/L (ref 3.5–5.3)
PROT SERPL-MCNC: 6.1 G/DL (ref 6.4–8.2)
SODIUM SERPL-SCNC: 134 MMOL/L (ref 136–145)

## 2025-01-25 LAB
AMITRIP SERPL-MCNC: 46 NG/ML
AMITRIP+NOR SERPL-MCNC: 257 NG/ML (ref 95–250)
NORTRIP SERPL-MCNC: 211 NG/ML (ref 50–150)

## 2025-02-03 ENCOUNTER — APPOINTMENT (OUTPATIENT)
Dept: PRIMARY CARE | Facility: CLINIC | Age: 60
End: 2025-02-03
Payer: COMMERCIAL

## 2025-02-03 VITALS
DIASTOLIC BLOOD PRESSURE: 74 MMHG | HEART RATE: 80 BPM | WEIGHT: 123 LBS | SYSTOLIC BLOOD PRESSURE: 128 MMHG | RESPIRATION RATE: 20 BRPM | BODY MASS INDEX: 22.5 KG/M2 | TEMPERATURE: 97.7 F

## 2025-02-03 DIAGNOSIS — Z12.11 COLON CANCER SCREENING: Primary | ICD-10-CM

## 2025-02-03 DIAGNOSIS — F51.01 PRIMARY INSOMNIA: ICD-10-CM

## 2025-02-03 PROCEDURE — 99213 OFFICE O/P EST LOW 20 MIN: CPT | Performed by: FAMILY MEDICINE

## 2025-02-03 RX ORDER — NORTRIPTYLINE HYDROCHLORIDE 25 MG/1
150 CAPSULE ORAL NIGHTLY
Qty: 180 CAPSULE | Refills: 2 | Status: SHIPPED | OUTPATIENT
Start: 2025-02-03 | End: 2025-05-04

## 2025-02-03 NOTE — PROGRESS NOTES
Subjective   Patient ID: Sarthak Meneses is a 59 y.o. female who presents for Follow-up (6 month follow up. Pt says its to go over results she recently had done. ).    HPI     Review of Systems   Constitutional:  Positive for fatigue. Negative for appetite change.   Eyes:  Negative for visual disturbance.   Respiratory:  Negative for chest tightness and shortness of breath.    Cardiovascular:  Negative for chest pain and leg swelling.   Gastrointestinal:  Negative for diarrhea, nausea and vomiting.   Endocrine: Negative for polydipsia, polyphagia and polyuria.   Genitourinary:  Negative for frequency and urgency.   Musculoskeletal:  Negative for arthralgias and myalgias.   Neurological:  Negative for dizziness, syncope, weakness, numbness and headaches.   Psychiatric/Behavioral:  Positive for sleep disturbance. The patient is nervous/anxious.        Objective   /74   Pulse 80   Temp 36.5 °C (97.7 °F)   Resp 20   Wt 55.8 kg (123 lb)   BMI 22.50 kg/m²     Physical Exam  Constitutional:       Appearance: Normal appearance.   HENT:      Head: Normocephalic.   Cardiovascular:      Rate and Rhythm: Normal rate and regular rhythm.   Pulmonary:      Effort: Pulmonary effort is normal.      Breath sounds: Normal breath sounds.   Musculoskeletal:      Cervical back: Neck supple.   Skin:     General: Skin is warm and dry.   Neurological:      General: No focal deficit present.      Mental Status: She is alert and oriented to person, place, and time.   Psychiatric:         Mood and Affect: Mood normal.         Speech: Speech normal.         Behavior: Behavior normal.         Thought Content: Thought content does not include homicidal or suicidal ideation.         Cognition and Memory: Cognition normal.         Assessment/Plan   Problem List Items Addressed This Visit    None  Visit Diagnoses         Codes    Colon cancer screening    -  Primary Z12.11    Relevant Orders    Cologuard® colon cancer screening     Primary insomnia     F51.01    Relevant Medications    nortriptyline (Pamelor) 25 mg capsule        Pt to zoe nortriptyline

## 2025-02-05 DIAGNOSIS — K59.04 CHRONIC IDIOPATHIC CONSTIPATION: ICD-10-CM

## 2025-02-05 ASSESSMENT — ENCOUNTER SYMPTOMS
DIZZINESS: 0
MYALGIAS: 0
NERVOUS/ANXIOUS: 1
SHORTNESS OF BREATH: 0
DIARRHEA: 0
SLEEP DISTURBANCE: 1
HEADACHES: 0
CHEST TIGHTNESS: 0
VOMITING: 0
ARTHRALGIAS: 0
FREQUENCY: 0
APPETITE CHANGE: 0
WEAKNESS: 0
FATIGUE: 1
POLYPHAGIA: 0
NUMBNESS: 0
POLYDIPSIA: 0
NAUSEA: 0

## 2025-02-07 RX ORDER — LUBIPROSTONE 24 UG/1
24 CAPSULE ORAL
Qty: 180 CAPSULE | Refills: 3 | Status: SHIPPED | OUTPATIENT
Start: 2025-02-07 | End: 2026-02-07

## 2025-02-17 LAB — NONINV COLON CA DNA+OCC BLD SCRN STL QL: NEGATIVE

## 2025-02-18 ENCOUNTER — TELEPHONE (OUTPATIENT)
Dept: PRIMARY CARE | Facility: CLINIC | Age: 60
End: 2025-02-18
Payer: COMMERCIAL

## 2025-02-18 NOTE — TELEPHONE ENCOUNTER
Problem: Delirium, Risk for  Goal: # No symptoms of delirium  Description: Evaluate delirium symptoms under active problem when present  Outcome: Outcome Met, Continue evaluating goal progress toward completion     Problem: At Risk for Falls  Goal: # Patient does not fall  Outcome: Outcome Met, Continue evaluating goal progress toward completion     Problem: VTE, Risk for  Goal: # No s/s of VTE  Outcome: Outcome Met, Continue evaluating goal progress toward completion     Problem: Pain  Goal: #Acceptable pain level achieved/maintained at rest using NRS/Faces  Description: This goal is used for patients who can self-report.  Acceptable means the level is at or below the identified comfort/function goal.  Outcome: Outcome Met, Continue evaluating goal progress toward completion      ----- Message from David Youssef sent at 2/18/2025  9:21 AM EST -----  Call Sarthak Meneses Their labs were normal  Call pt their cologuard colon cancer screening test was negative and it should be repeated in 3 years.

## 2025-02-20 ENCOUNTER — OFFICE VISIT (OUTPATIENT)
Dept: PRIMARY CARE | Facility: CLINIC | Age: 60
End: 2025-02-20
Payer: COMMERCIAL

## 2025-02-20 VITALS
HEART RATE: 84 BPM | DIASTOLIC BLOOD PRESSURE: 76 MMHG | RESPIRATION RATE: 18 BRPM | BODY MASS INDEX: 22.73 KG/M2 | WEIGHT: 124.3 LBS | TEMPERATURE: 98.2 F | SYSTOLIC BLOOD PRESSURE: 129 MMHG

## 2025-02-20 DIAGNOSIS — M54.32 LEFT SIDED SCIATICA: Primary | ICD-10-CM

## 2025-02-20 PROCEDURE — 96372 THER/PROPH/DIAG INJ SC/IM: CPT | Performed by: FAMILY MEDICINE

## 2025-02-20 PROCEDURE — 99213 OFFICE O/P EST LOW 20 MIN: CPT | Performed by: FAMILY MEDICINE

## 2025-02-20 RX ORDER — METHYLPREDNISOLONE 4 MG/1
TABLET ORAL
Qty: 21 TABLET | Refills: 0 | Status: SHIPPED | OUTPATIENT
Start: 2025-02-20 | End: 2025-02-26

## 2025-02-20 RX ORDER — TIZANIDINE 4 MG/1
4 TABLET ORAL NIGHTLY
Qty: 10 TABLET | Refills: 0 | Status: SHIPPED | OUTPATIENT
Start: 2025-02-20 | End: 2025-03-02

## 2025-02-20 RX ORDER — METHYLPREDNISOLONE ACETATE 80 MG/ML
80 INJECTION, SUSPENSION INTRA-ARTICULAR; INTRALESIONAL; INTRAMUSCULAR; SOFT TISSUE ONCE
Status: COMPLETED | OUTPATIENT
Start: 2025-02-20 | End: 2025-02-20

## 2025-02-20 RX ADMIN — METHYLPREDNISOLONE ACETATE 80 MG: 80 INJECTION, SUSPENSION INTRA-ARTICULAR; INTRALESIONAL; INTRAMUSCULAR; SOFT TISSUE at 16:12

## 2025-02-20 NOTE — PROGRESS NOTES
Subjective   Patient ID: Sarthak Meneses is a 59 y.o. female who presents for Pain (Pain in left buttock that radiates down leg for a few weeks.).    HPI     Review of Systems   Musculoskeletal:  Positive for arthralgias, gait problem and myalgias.   Neurological:  Positive for numbness.       Objective   /76   Pulse 84   Temp 36.8 °C (98.2 °F)   Resp 18   Wt 56.4 kg (124 lb 4.8 oz)   BMI 22.73 kg/m²     Physical Exam  Musculoskeletal:      Left hip: Tenderness present. No bony tenderness. Decreased range of motion.        Legs:       Comments: Left pirifiormis tenderness   Skin:     Findings: No rash.         Assessment/Plan   Problem List Items Addressed This Visit    None  Visit Diagnoses         Codes    Left sided sciatica    -  Primary M54.32    Relevant Medications    methylPREDNISolone acetate (DEPO-Medrol) injection 80 mg (Completed)    methylPREDNISolone (Medrol Dospak) 4 mg tablets    tiZANidine (Zanaflex) 4 mg tablet

## 2025-02-21 ASSESSMENT — ENCOUNTER SYMPTOMS
MYALGIAS: 1
ARTHRALGIAS: 1
NUMBNESS: 1

## 2025-02-25 ENCOUNTER — TELEPHONE (OUTPATIENT)
Dept: PRIMARY CARE | Facility: CLINIC | Age: 60
End: 2025-02-25
Payer: COMMERCIAL

## 2025-02-25 NOTE — TELEPHONE ENCOUNTER
----- Message from David Youssef sent at 2/24/2025 10:21 AM EST -----  See message Also how is the sciatica today?  ----- Message -----  From: David Youssef DO  Sent: 2/24/2025  12:00 AM EST  To: David Youssef, DO    How is nortriptline ween going

## 2025-03-02 DIAGNOSIS — F51.01 PRIMARY INSOMNIA: ICD-10-CM

## 2025-03-03 RX ORDER — NORTRIPTYLINE HYDROCHLORIDE 25 MG/1
150 CAPSULE ORAL NIGHTLY
Qty: 540 CAPSULE | Refills: 3 | Status: SHIPPED | OUTPATIENT
Start: 2025-03-03 | End: 2026-02-26

## 2025-03-04 ENCOUNTER — PATIENT MESSAGE (OUTPATIENT)
Dept: PRIMARY CARE | Facility: CLINIC | Age: 60
End: 2025-03-04

## 2025-03-04 ENCOUNTER — OFFICE VISIT (OUTPATIENT)
Dept: PRIMARY CARE | Facility: CLINIC | Age: 60
End: 2025-03-04
Payer: COMMERCIAL

## 2025-03-04 VITALS
WEIGHT: 124 LBS | HEART RATE: 90 BPM | SYSTOLIC BLOOD PRESSURE: 115 MMHG | DIASTOLIC BLOOD PRESSURE: 76 MMHG | BODY MASS INDEX: 22.68 KG/M2 | OXYGEN SATURATION: 98 %

## 2025-03-04 DIAGNOSIS — S76.319A HAMSTRING TEAR: Primary | ICD-10-CM

## 2025-03-04 PROCEDURE — 99213 OFFICE O/P EST LOW 20 MIN: CPT | Performed by: FAMILY MEDICINE

## 2025-03-04 RX ORDER — IBUPROFEN 800 MG/1
800 TABLET ORAL 3 TIMES DAILY PRN
Qty: 90 TABLET | Refills: 5 | Status: SHIPPED | OUTPATIENT
Start: 2025-03-04 | End: 2026-03-04

## 2025-03-04 ASSESSMENT — ENCOUNTER SYMPTOMS
NUMBNESS: 0
ARTHRALGIAS: 1
DIFFICULTY URINATING: 0
MYALGIAS: 1
GASTROINTESTINAL NEGATIVE: 1

## 2025-03-04 NOTE — PROGRESS NOTES
Subjective   Patient ID: Sarthak Meneses is a 59 y.o. female who presents for Follow-up (Pt states pain has worsen after finishing the steroid, she has been taking Advil and muscles relaxer's but still aren't helpful.).    HPI     Review of Systems   Gastrointestinal: Negative.    Genitourinary: Negative.  Negative for difficulty urinating.   Musculoskeletal:  Positive for arthralgias and myalgias.   Neurological:  Negative for numbness.       Objective   /76   Pulse 90   Wt 56.2 kg (124 lb)   SpO2 98%   BMI 22.68 kg/m²     Physical Exam  Musculoskeletal:         General: Tenderness present.        Legs:       Comments: Poain over hamstring insertion on the left         Assessment/Plan   Problem List Items Addressed This Visit    None  Visit Diagnoses         Codes    Hamstring tear    -  Primary S76.319A    Relevant Medications    ibuprofen 800 mg tablet    Other Relevant Orders    Referral to Orthopaedic Surgery

## 2025-03-10 ENCOUNTER — TELEPHONE (OUTPATIENT)
Dept: PRIMARY CARE | Facility: CLINIC | Age: 60
End: 2025-03-10
Payer: COMMERCIAL

## 2025-03-10 DIAGNOSIS — E03.9 HYPOTHYROIDISM, UNSPECIFIED TYPE: ICD-10-CM

## 2025-03-10 RX ORDER — SPIRONOLACTONE 100 MG/1
100 TABLET, FILM COATED ORAL DAILY
Qty: 90 TABLET | Refills: 3 | Status: SHIPPED | OUTPATIENT
Start: 2025-03-10

## 2025-03-10 NOTE — TELEPHONE ENCOUNTER
Patient requests prescription below    Last Office Visit: 3/4/2025   Next Office Visit: 7/31/2025     Requested Prescriptions     Pending Prescriptions Disp Refills    spironolactone (Aldactone) 100 mg tablet 90 tablet 3     Sig: Take 1 tablet (100 mg) by mouth once daily.

## 2025-03-13 ENCOUNTER — APPOINTMENT (OUTPATIENT)
Dept: ORTHOPEDIC SURGERY | Facility: CLINIC | Age: 60
End: 2025-03-13
Payer: COMMERCIAL

## 2025-03-20 ENCOUNTER — APPOINTMENT (OUTPATIENT)
Dept: ORTHOPEDIC SURGERY | Facility: CLINIC | Age: 60
End: 2025-03-20
Payer: COMMERCIAL

## 2025-03-25 ENCOUNTER — HOSPITAL ENCOUNTER (OUTPATIENT)
Dept: RADIOLOGY | Facility: CLINIC | Age: 60
Discharge: HOME | End: 2025-03-25
Payer: COMMERCIAL

## 2025-03-25 ENCOUNTER — APPOINTMENT (OUTPATIENT)
Dept: ORTHOPEDIC SURGERY | Facility: CLINIC | Age: 60
End: 2025-03-25
Payer: COMMERCIAL

## 2025-03-25 DIAGNOSIS — M54.50 LUMBAR PAIN: ICD-10-CM

## 2025-03-25 DIAGNOSIS — S76.319A HAMSTRING TEAR: ICD-10-CM

## 2025-03-25 DIAGNOSIS — M43.16 SPONDYLOLISTHESIS OF LUMBAR REGION: ICD-10-CM

## 2025-03-25 PROCEDURE — 99213 OFFICE O/P EST LOW 20 MIN: CPT | Performed by: ORTHOPAEDIC SURGERY

## 2025-03-25 PROCEDURE — 72100 X-RAY EXAM L-S SPINE 2/3 VWS: CPT

## 2025-03-25 PROCEDURE — 72100 X-RAY EXAM L-S SPINE 2/3 VWS: CPT | Performed by: RADIOLOGY

## 2025-03-25 PROCEDURE — 99203 OFFICE O/P NEW LOW 30 MIN: CPT | Performed by: ORTHOPAEDIC SURGERY

## 2025-03-25 PROCEDURE — 1036F TOBACCO NON-USER: CPT | Performed by: ORTHOPAEDIC SURGERY

## 2025-03-25 RX ORDER — CYCLOBENZAPRINE HCL 10 MG
10 TABLET ORAL 2 TIMES DAILY PRN
Qty: 20 TABLET | Refills: 0 | Status: SHIPPED | OUTPATIENT
Start: 2025-03-25 | End: 2025-04-14

## 2025-03-25 RX ORDER — MELOXICAM 15 MG/1
15 TABLET ORAL DAILY
Qty: 30 TABLET | Refills: 0 | Status: SHIPPED | OUTPATIENT
Start: 2025-03-25 | End: 2025-04-24

## 2025-03-25 NOTE — PROGRESS NOTES
History of Present Illness:   Patient presents today for evaluation of left posterior hip, buttock pain.  Patient also endorses some mild low back pain.  There is no recent trauma or any significant history of lumbar spine issues.  The patient does not endorse any significant groin or anterior hip pain.  The pain is described as episodic severe and does radiate down the leg the buttock posteriorly.  Regarding prior treatments: She had received an intramuscular steroid injection about 5 weeks ago as well as a steroid pack that helped her somewhat.  She notes that she did have some relief several days after this and was able to complete more workouts which exacerbated her pain.    Original injury was about 5 and half weeks ago when she was walking up a steep incline for part of her exercise regimen, denies any pop or tearing sensation but notes in the subsequent hours following the training she experienced significant posterior thigh and buttock pain that radiates down the entire leg.    Review of Systems   GENERAL: Negative  GI: Negative  MUSCULOSKELETAL: See HPI  SKIN: Negative  NEURO:  Negative    Physical Examination:  Left Hip:  Normal gait  Negative Trendelenburg sign  Skin healthy and intact  No tenderness to palpation over lumbar spine  No tenderness over greater trochanter    Full forward flexion  Symmetric motion, no loss of internal rotation   No weakness with resisted hip flexion, abduction or adduction    Negative flexion/adduction/internal rotation  Negative flexion/abduction/external rotation test  Pain with straight leg raise    Motor exam: Gross strength intact knee flexion extension, hip flexion extension, ankle dorsiflexion plantarflexion without any obvious deficits  Sensation intact L2-S1  No upper motor neuron signs    Imaging:  Plain films lumbar spine reveal spondylolisthesis of L4/5    Assessment:   Patient with left lumbar radiculopathy, sciatica     Plan:  We reviewed the disease process  with the patient we discussed a variety of treatment options.  We reviewed the importance of physical therapy including home exercises for core stretching and stability, hamstring flexibility etc.  We reviewed oral medications including NSAIDs risks and benefits, Medrol Dosepak, muscle relaxers.    We discussed treatment options ranging from nonoperative, physical therapy medications, image guided injections, surgical intervention etc.    We will refer to the spine team for evaluation. Recommended PT and also MDP/NSAIDs.  Will prescribe meloxicam today.  We also discussed possible MRI lumbar spine    Atif Richmond PA-C     In a face to face encounter, I evaluated the patient and performed a physical examination, discussed pertinent diagnostic studies if indicated and discussed diagnosis and management strategies with both the patient and physician assistant / nurse practitioner.  I reviewed the PA/NP's note and agree with the documented findings and plan of care.    Patient is active healthy, evidence of spondylolisthesis at L4-5.  She has significant pain weakness consistent with sciatica.  Discussed anti-inflammatory as well as muscle relaxer will refer to spine for evaluation MRI ordered.    A nonsteroidal anti-inflammatory drug (NSAID) was prescribed.  We reviewed the risks (including gastric issues, renal issues) and benefits of the medication.  We discussed a scheduled course if no adverse reactions to help with swelling / pain.  We discussed that chronic usage should be checked with primary care physician.        Sukumar Ortiz MD

## 2025-04-03 ENCOUNTER — APPOINTMENT (OUTPATIENT)
Dept: ORTHOPEDIC SURGERY | Facility: CLINIC | Age: 60
End: 2025-04-03
Payer: COMMERCIAL

## 2025-04-05 ENCOUNTER — APPOINTMENT (OUTPATIENT)
Dept: RADIOLOGY | Facility: HOSPITAL | Age: 60
End: 2025-04-05
Payer: COMMERCIAL

## 2025-04-07 ENCOUNTER — APPOINTMENT (OUTPATIENT)
Dept: ORTHOPEDIC SURGERY | Facility: CLINIC | Age: 60
End: 2025-04-07
Payer: COMMERCIAL

## 2025-04-08 ENCOUNTER — HOSPITAL ENCOUNTER (OUTPATIENT)
Dept: RADIOLOGY | Facility: HOSPITAL | Age: 60
Discharge: HOME | End: 2025-04-08
Payer: COMMERCIAL

## 2025-04-08 DIAGNOSIS — S76.319A HAMSTRING TEAR: ICD-10-CM

## 2025-04-08 DIAGNOSIS — M54.50 LUMBAR PAIN: ICD-10-CM

## 2025-04-08 DIAGNOSIS — M43.16 SPONDYLOLISTHESIS OF LUMBAR REGION: ICD-10-CM

## 2025-04-08 PROCEDURE — 72148 MRI LUMBAR SPINE W/O DYE: CPT

## 2025-04-15 NOTE — PROGRESS NOTES
Physical Therapy    Physical Therapy Evaluation and Treatment      Patient Name: Sarthak Meneses  MRN: [id number]  Today's Date: 4/15/2025    Time Entry:                           Insurance:  [employer]  $10 copay  0T coins  60 V/Y  PA not req  Visit: 1  POC: ***      Assessment:  *** y/o *** presents to outpatient physical therapy with reports of *** d/t ***. The patient presents with the current impairments of ***. These impairments currently limit their ability to ***. Due to the limitations listed above, the patient is currently at a decreased functional level compared to baseline, and they would benefit from skilled physical therapy to improve *** and facilitate a safe and efficient return to functional baseline. Patient's prognosis for improvement with therapy is *** at this time.        Plan:       Complexity:  ***    Current Problem:   No diagnosis found.    Subjective    ***    Pain intensity: ***/10 at this time, ***/10 at worst, ***/10 at best    Aggravating factors: ***    Relieving factors: ***    Patient goals: ***    General:     Precautions:     Pain:     Home Living:     Prior Level of Function:       Objective   Lumbar AROM:  Flexion ***  Extension ***  RSB ***  LSB ***    LE Strength:  Hip flexion R ***/5, L ***/5  Hip abd R ***/5, L ***/5  Hip ext R ***/5, L ***/5  Knee flexion R ***/5, L ***/5  Knee extension R /5, L ***/5    HS length (90/90):  R ***  L ***    Response to repeated movements:   RFIS: ***  SURINDER: ***  RFIL: ***  REIL: ***    Gait: ***    Posture: ***    Dermatomes: ***    Palpation: ***    Special tests:   Compression: ***  Gapping: ***  Sacral Thrust: ***  Thigh Thrust: ***  Slump: ***  Louis: ***    *** saddle paraesthesias and changes in bowel/bladder function    MRI lumbar 4/8/25:   There is slight dextro convexity of the lumbar spine. There is mild  anterior subluxation of L4 relative to L5 measuring 5 mm and anterior  subluxation of L5 measuring 2 mm. There is disc  desiccation and disc  space narrowing at L4-5 and L5-S1. There is mild disc desiccation at  L3-4 and T12-L1.      The lumbar vertebral body heights are maintained. There is no acute  bone marrow edema. There is minimal degenerative endplate signal at  L4-5 and L5-S1.      The conus medullaris is of normal morphology and signal. The tip of  the conus descends to the L1 vertebral body.      Axial images are performed through the lumbar disc spaces from the  mid T12 level through S1.      The T12-L1 disc space level is unremarkable.      The L1-2 disc space level is unremarkable.      The L2-3 disc space level demonstrates minimal facet arthrosis though  is otherwise unremarkable.      The L3-4 disc space level demonstrates mild bilateral facet arthrosis  and ligamentum flavum hypertrophy. There is mild bulging disc with  some flattening of the anterior thecal sac. There is no significant  central canal or neural foraminal stenosis.      The L4-5 disc space level demonstrates moderate bilateral facet  arthrosis with fluid signal in the facet joint spaces. There is  anterior subluxation of L4 with superimposed mild bulging disc. There  is no significant central canal narrowing. There is mild disc  encroachment on the caudal neural foramen without significant neural  foraminal stenosis.      The L5-S1 disc space level is poorly profiled. There is moderate  bilateral facet arthrosis greater on the left with fluid signal in  both joint spaces. There is mild bilateral facet arthrosis. There is  mild bulging disc with left paracentral disc protrusion. Disc  material may contact the left S1 nerve root at the lateral recess.  There is no significant central canal or neural foraminal stenosis.  There is multilobular septated cyst posterior to the left facet joint  measuring 1 cm in diameter.      The surrounding soft tissues are unremarkable.      Outcome Measures:  {PT Outcome Measures:10698}     Treatments:  {PT  Treatments:90951}    EDUCATION:       Goals:  By discharge:  ***

## 2025-04-16 ENCOUNTER — APPOINTMENT (OUTPATIENT)
Dept: PHYSICAL THERAPY | Facility: CLINIC | Age: 60
End: 2025-04-16
Payer: COMMERCIAL

## 2025-04-17 ENCOUNTER — APPOINTMENT (OUTPATIENT)
Dept: ORTHOPEDIC SURGERY | Facility: CLINIC | Age: 60
End: 2025-04-17
Payer: COMMERCIAL

## 2025-04-23 ENCOUNTER — APPOINTMENT (OUTPATIENT)
Dept: RADIOLOGY | Facility: CLINIC | Age: 60
End: 2025-04-23
Payer: COMMERCIAL

## 2025-04-23 ENCOUNTER — EVALUATION (OUTPATIENT)
Dept: PHYSICAL THERAPY | Facility: CLINIC | Age: 60
End: 2025-04-23
Payer: COMMERCIAL

## 2025-04-23 DIAGNOSIS — M54.50 LUMBAR PAIN: ICD-10-CM

## 2025-04-23 DIAGNOSIS — S76.319A HAMSTRING TEAR: Primary | ICD-10-CM

## 2025-04-23 DIAGNOSIS — M43.16 SPONDYLOLISTHESIS OF LUMBAR REGION: ICD-10-CM

## 2025-04-23 PROCEDURE — 97162 PT EVAL MOD COMPLEX 30 MIN: CPT | Mod: GP

## 2025-04-23 PROCEDURE — 97110 THERAPEUTIC EXERCISES: CPT | Mod: GP

## 2025-04-23 NOTE — PROGRESS NOTES
Physical Therapy     Physical Therapy Evaluation                                        Patient Name: Sarthak Meneses  MRN: [id number]  :  1965  Today's Date: 2025  Time Calculation  Start Time: 1528  Stop Time: 1611  Time Calculation (min): 43 min  PT Evaluation Time Entry  PT Evaluation (Moderate) Time Entry: 30  PT Therapeutic Procedures Time Entry  Therapeutic Exercise Time Entry: 13     From Chart Review:  Patient presents today for evaluation of left posterior hip, buttock pain.  Patient also endorses some mild low back pain.  There is no recent trauma or any significant history of lumbar spine issues.  The patient does not endorse any significant groin or anterior hip pain.  The pain is described as episodic severe and does radiate down the leg the buttock posteriorly.  Regarding prior treatments: She had received an intramuscular steroid injection about 5 weeks ago as well as a steroid pack that helped her somewhat.  She notes that she did have some relief several days after this and was able to complete more workouts which exacerbated her pain.     Original injury was about 5 and half weeks ago when she was walking up a steep incline for part of her exercise regimen, denies any pop or tearing sensation but notes in the subsequent hours following the training she experienced significant posterior thigh and buttock pain that radiates down the entire leg.  Reason for Visit  Referred by: Sukumar Ortiz MD  Referral DX date: 3/25/25     Diagnosis:  1. Hamstring tear    2. Lumbar pain    3. Spondylolisthesis of lumbar region        Insurance:  Visit: #1  POC: 12  Authorized: *Pending*  Certification: 2025 to 25  Payor: [employer] / Plan: [employer]  HEALTHCARE / Product Type: *No Product type* /     Subjective:  8 weeks ago, pain in L posterior hip. Possible overuse at gym. MRI results say bulge in lumbar disks  Current Episode  Date of Onset:  8 weeks ago  Mechanism of injury:  working  out  Chief Complaint:  pain   Relevant incidents/injuries:  none  Progression of symptoms:  same  Previous treatments:  meds, cortisone shots, tylenol/Advil   Relevant medical history:  none     Prior level of function: none  Functional limitations: gym,, driving/car rides  Home environment: 1 flight of stairs bilat hand rails  Work status/occupation: teacher  Recreational activity: workout at gym     Patient stated goals for treatment: get back to working out, advice on sitting and activity.      Reviewed medical screening history form with patient.     Precautions: none           Pain:  Location: L hip radiating to posterior leg to foot  Current pain: 7/10; Range: 0-10/10  Aggravating factor: standing/walking/sitting, movement  Alleviating factor: meds, heating pad     Objective:  Observation/Posture: high guarding with trunk ROM, tender to palpation at low back and HS origin on L     AROM/PROM  Lower Extremity Right Left   Hip Flex  100 WNL WNL   Hip Ext  30  unable to test due to pain   Knee Flex  130  WNL   Knee Ext  0     DF  30     PF  40     Hip Abd  40     Hip Add  20            MMT:   Lower Extremity Right Left   Hip Flex 4/5 4/5   Hip Ext 4/5 Unable due to pain   Knee Flex 4+/5 Unable due to pain   Knee Ext 4+/5 4-/5   DF 4+/5 4+/5   PF 4+/5 4+/5   Hip Abd 4/5 4/5   Hip Add 4/5 4/5       Balance:   unable to test due to pain     Gait:   Antalgic gait on L.      Functional testing:   Unable due to pain    Sensation:   Patient denies numbness/tingling of bilateral LOWER extremities.  Light Touch: normal    Coordination:  Heel to shin: unable to test due to pain       Outcome Measure:  Other Measures  5x Sit to Stand: Unalbe to test due to pain  Lower Extremity Funtional Score (LEFS): 13/80  Oswestry Disablity Index (CRYSTAL): 60%       Treatment:  Provided education regarding POC, goals created, reasoning for assessments performed and results of those assessments. Additionally, provided education regarding  scheduling proposal with pt asked about scheduling preferences, call in/no show policy, and pt provides verbal confirmation of understanding.  Seated Slump Nerve Glide: 2x8  Bridges: 1x10  Prone HS curl with yellow TB: 1x8  Seated Hip Internal Rotation with Resistance with yellow TB: 1x12     OP Education: HEP:   Access Code: [email or web address]  URL: [email or web address]  Date: 04/23/2025  Prepared by: Rudy Rueda    Exercises  - Supine 90/90 Sciatic Nerve Glide with Knee Flexion/Extension  - 1 x daily - 5 x weekly - 2 sets - 8 reps - 30 sec hold  - Seated Slump Nerve Glide  - 1 x daily - 5 x weekly - 2 sets - 8 reps - 30 sec hold  - Supine Bridge  - 1 x daily - 3 x weekly - 3 sets - 12 reps - 3 sec hold  - Prone Knee Flexion  - 1 x daily - 7 x weekly - 3 sets - 10 reps  - Seated Hip Internal Rotation with Resistance  - 1 x daily - 3 x weekly - 3 sets - 12 reps    Assessment:  Patient is a 59 y.o. FEMALE presents to [facility] for assessment and treatment of mobility related impairments s/p HS injury after working out complicated by high pain for longer than 8 weeks and MRI results of disk bulge to sciatic nerve.   Patient is alert and oriented x 3.  Patient presents with medical diagnosis of HS tear, lumbar spondylolisthesis, and lumbar pain  contributing to compensatory soft tissue dysfunction, pain, stiffness and weakness of the L LE and back. Significant past medical history/past surgical history includes c-sections. Skilled care is needed to progress the patient back to these activities without exacerbating symptoms. Patient requires skilled PT services to address the problems identified and the individualized patient's goals as outlined in the problems and goals section of this evaluation. A skilled PT is required to address these key impairments and to provide and progress with an appropriate home exercise program. Patient does not have significant PMH influencing Rx and reports  motivation to return to FUNCTIONAL ACTIVITY, RETURN TO WORK, and RETURN TO SPORT. Patient demonstrates to be a good candidate for physical therapy with good rehab potential and verbalized a good understanding of their diagnosis, prognosis and treatment. Pt is motivated and has strong family support which reinforces their potential for improvement.  Pt would benefit from skilled physical therapy to improve strength and ROM to LE's, as well as decrease pain.  Goals have been established and reviewed with the patient.      PT Assessment Results: Decreased strength, Decreased range of motion, Decreased endurance, Impaired balance, Decreased mobility, Pain  Rehab Prognosis: Good       Plan:  Frequency/duration:   Treatment/Interventions: Biofeedback, Blood flow restriction therapy, Dry needling, Education/ Instruction, Electrical stimulation, Gait training, Hot pack, Manual therapy, Mechanical traction, Neuromuscular re-education, Self care/ home management, Taping techniques, Therapeutic activities, Therapeutic exercises, Ultrasound, Vasopneumatic device  PT Plan: Skilled PT  PT Frequency:  (1-2x/week for 4 weeks, then 1x/week for 4 weeks)  Duration: 11 more visits  Onset Date: 02/18/25  Certification Period Start Date: 04/23/25  Certification Period End Date: 07/22/25     Rehab Potential: Good  Plan of Care Agreement: Patient    Goals:  Understand and demonstrate a home exercise program that will support and continue the process of neural plasticity resulting in continued improvement of lower extremity function, increased mobility, and safety.  Pt will demo AROM of the L knee 0-115 to improve her ability to ambulate, negotiate stairs, and squat without restriction  Pt will demo gross hip and knee strength of >/= 4+/5 to improve the ability to ambulate, squat, and lift without restrictions  Pt will report pain of 0/10 at rest, and no greater than 3/10 with any activity including standing, walking, stairs, and  transfers  Pt will decrease Oswestry score by 7 or better to reach established MCID and to demonstrate improved ADL and functional mobility. MCID includes 50% and 36% change, or 7 point change. Base line 4/23/2025, 30/50.  Patient will demonstrate an increased score in LEFS score by 9 points to </=  22/80 to meet established MCID for the outcome measure (baseline 4/23/2025 : 13/80).       Complexity:  Medium complexity evaluation due to a past medical history WITHOUT personal factors and/or comorbidities that could impact the POC, 30 minutes for evaluation, examination of body systems completed with the patient presenting with a stable condition, and clinical decision making.    Rudy Rueda, PT  4/23/2025

## 2025-04-29 ENCOUNTER — HOSPITAL ENCOUNTER (OUTPATIENT)
Dept: RADIOLOGY | Facility: CLINIC | Age: 60
Discharge: HOME | End: 2025-04-29
Payer: COMMERCIAL

## 2025-04-29 ENCOUNTER — OFFICE VISIT (OUTPATIENT)
Dept: ORTHOPEDIC SURGERY | Facility: CLINIC | Age: 60
End: 2025-04-29
Payer: COMMERCIAL

## 2025-04-29 DIAGNOSIS — M54.16 LUMBAR RADICULOPATHY: ICD-10-CM

## 2025-04-29 DIAGNOSIS — M54.50 LUMBAR PAIN: ICD-10-CM

## 2025-04-29 PROCEDURE — 72100 X-RAY EXAM L-S SPINE 2/3 VWS: CPT

## 2025-04-29 PROCEDURE — 99212 OFFICE O/P EST SF 10 MIN: CPT | Performed by: ORTHOPAEDIC SURGERY

## 2025-04-29 PROCEDURE — 99215 OFFICE O/P EST HI 40 MIN: CPT | Performed by: ORTHOPAEDIC SURGERY

## 2025-04-29 RX ORDER — CELECOXIB 200 MG/1
200 CAPSULE ORAL DAILY
Qty: 30 CAPSULE | Refills: 0 | Status: SHIPPED | OUTPATIENT
Start: 2025-04-29 | End: 2025-05-29

## 2025-04-29 RX ORDER — METHOCARBAMOL 500 MG/1
500 TABLET, FILM COATED ORAL 4 TIMES DAILY
Qty: 40 TABLET | Refills: 0 | Status: SHIPPED | OUTPATIENT
Start: 2025-04-29 | End: 2025-05-09

## 2025-04-29 RX ORDER — GABAPENTIN 300 MG/1
300 CAPSULE ORAL 2 TIMES DAILY
Qty: 60 CAPSULE | Refills: 0 | Status: SHIPPED | OUTPATIENT
Start: 2025-04-29 | End: 2025-05-29

## 2025-04-29 NOTE — PROGRESS NOTES
Chief complaint: Left leg pain    HPI: 9-week history of severe left leg radiculopathy.  Goes on her buttock hamstring calf to her heel on the left side.  Nothing on the top of the foot.  Nothing on the right side.  Activity makes it worse.  Rest makes it better.  She works as a teacher.  There is no fevers chills nausea vomiting.  There is no bowel or bladder changes.  No saddle anesthesia.  No history of spine surgery.  No formal treatment for this other than physical therapy which she is failed.  She cannot live like this anymore.  She is tried anti-inflammatories.    Physical exam: Well-nourished, well kept.  Good perfusion to the extremities ×4.  Radial and dorsalis pedis pulses 2+.  Capillary refill to all 4 digits brisk.  No distal edema x 4.  No lymphangitis or lymphadenopathy in the examined extremities.  Gait normal.  Can walk on heels and toes.  Thoracic spine is nontender.  Examination of the back shows tenderness in the paraspinous musculature.  There is decreased range of motion in all directions due to guarding/muscle spasms and pain at extremes.  There is good strength and no instability.  Examination of the lower extremities reveals no point tenderness, swelling, or deformity.  Range of motion of the hips, knees, and ankles are full without crepitance, instability, or exacerbation of pain.  Strength is 5/5 throughout except some mild weakness with plantarflexion on the left but not that bad..  No redness, abrasions, or lesions on all 4 extremities, head and neck, or trunk.  Gross sensation intact in the extremities ×4.  Deep tendon reflexes 2+ and symmetric bilaterally.  Donn and clonus were negative.  Straight leg raise negative.  Affect normal.  Alert and oriented ×3.  Coordination normal.    Flexion-extension x-rays were ordered and reviewed today and it shows a dynamic spondylolisthesis which slips forward to 11 mm at L4-5 and a mild 1 mm spondylolisthesis at L5-S1.  MRI was reviewed from  April 8, 2025 and it shows a herniated disc on the left at L5-S1 causing compression of the S1 nerve root.  There is no apparent compression at L4-5 but the patient spondylolisthesis mostly reduces when she is laying on her back in the scanner.    Assessment/plan: Patient with what described imaging studies and left leg radiculopathy in an S1 nerve root distribution.  I had a long discussion with the patient and explained to them that their options are 1) live with the symptoms and see how they evolve, 2) physical therapy, 3) pain management or 4) surgery.  The patient wants to proceed with surgery as she cannot live like this anymore and she has failed physical therapy.  She has no interest in injections.    We discussed the fact that she has a known cause for this problem which is the L5-S1 left herniated disc and her pain is in the distribution of the S1 nerve root.  However she does have a dynamic spondylolisthesis at L4-5 and it is possible that the L4-5 level could be contributing despite the fact that she does not have distinct L4 nerve root distribution symptoms.  It is my opinion, that the L5-S1 level is the main driving factor of her symptoms but it certainly could be that the L4-5 level is also significantly contributing.  After a long discussion with her we decided to proceed forward only with the L5-S1 level which I think is very reasonable as that is most likely the cause of her symptoms.    All of the risks, benefits, and potential complications for operative and nonoperative treatment were discussed at length with the patient.  The patient understands that surgery is elective.  The patient understands that I do not have to do surgery.  The patient understands that surgery comes with more risks than not doing surgery.  Risks of operative intervention include, but are not limited to: Anesthesia complications, excessive blood loss, infection, damaged to uninjured structures including, but not limited  to, the dural sac and nerve roots, blood clots, and lack of improvement or worsening of symptoms.  These complications could result in death, permanent disability, or need for reoperation.  The patient completely understood those risks and wished to proceed with operative intervention.    We are going to perform an L5-S1 left microdiscectomy.    The patient has been prescribed a lumbar LSO back brace.  The orthosis is required to reduce pain by restricting mobility of the trunk.  The patient is to wear the prescribed item as instructed for all activities of daily living unless otherwise specified in my notes.  The patient may also remove for normal hygiene and sleep unless the device is in place for a fracture or other injury requiring 24-hour stabilization.

## 2025-05-08 ENCOUNTER — LAB (OUTPATIENT)
Dept: LAB | Facility: HOSPITAL | Age: 60
End: 2025-05-08
Payer: COMMERCIAL

## 2025-05-08 ENCOUNTER — HOSPITAL ENCOUNTER (OUTPATIENT)
Dept: CARDIOLOGY | Facility: HOSPITAL | Age: 60
Discharge: HOME | End: 2025-05-08
Payer: COMMERCIAL

## 2025-05-08 DIAGNOSIS — Z01.818 PRE-OP TESTING: ICD-10-CM

## 2025-05-08 LAB
ATRIAL RATE: 80 BPM
P AXIS: 34 DEGREES
P OFFSET: 192 MS
P ONSET: 134 MS
PR INTERVAL: 166 MS
Q ONSET: 217 MS
QRS COUNT: 13 BEATS
QRS DURATION: 100 MS
QT INTERVAL: 380 MS
QTC CALCULATION(BAZETT): 438 MS
QTC FREDERICIA: 418 MS
R AXIS: 66 DEGREES
T AXIS: 69 DEGREES
T OFFSET: 407 MS
VENTRICULAR RATE: 80 BPM

## 2025-05-08 PROCEDURE — 80053 COMPREHEN METABOLIC PANEL: CPT

## 2025-05-08 PROCEDURE — 93005 ELECTROCARDIOGRAM TRACING: CPT

## 2025-05-08 PROCEDURE — 83036 HEMOGLOBIN GLYCOSYLATED A1C: CPT

## 2025-05-08 PROCEDURE — 85610 PROTHROMBIN TIME: CPT

## 2025-05-08 PROCEDURE — 85025 COMPLETE CBC W/AUTO DIFF WBC: CPT

## 2025-05-15 ENCOUNTER — APPOINTMENT (OUTPATIENT)
Dept: PRIMARY CARE | Facility: CLINIC | Age: 60
End: 2025-05-15
Payer: COMMERCIAL

## 2025-05-15 VITALS
WEIGHT: 120 LBS | DIASTOLIC BLOOD PRESSURE: 78 MMHG | HEART RATE: 77 BPM | HEIGHT: 62 IN | OXYGEN SATURATION: 100 % | SYSTOLIC BLOOD PRESSURE: 118 MMHG | RESPIRATION RATE: 15 BRPM | BODY MASS INDEX: 22.08 KG/M2 | TEMPERATURE: 97 F

## 2025-05-15 DIAGNOSIS — Z01.818 PREOPERATIVE CLEARANCE: Primary | ICD-10-CM

## 2025-05-15 DIAGNOSIS — M51.26 LUMBAR HERNIATED DISC: ICD-10-CM

## 2025-05-15 PROCEDURE — 99214 OFFICE O/P EST MOD 30 MIN: CPT | Performed by: FAMILY MEDICINE

## 2025-05-15 ASSESSMENT — ENCOUNTER SYMPTOMS
VOMITING: 0
FATIGUE: 0
DIZZINESS: 0
BRUISES/BLEEDS EASILY: 0
NAUSEA: 0
ENDOCRINE NEGATIVE: 1
CONSTITUTIONAL NEGATIVE: 1
DIARRHEA: 0
WEAKNESS: 0
POLYPHAGIA: 0
MYALGIAS: 1
FREQUENCY: 0
NUMBNESS: 0
SHORTNESS OF BREATH: 0
CHEST TIGHTNESS: 0
APPETITE CHANGE: 0
ARTHRALGIAS: 1
POLYDIPSIA: 0
HEADACHES: 0
BACK PAIN: 1

## 2025-05-15 NOTE — PROGRESS NOTES
"Subjective   Patient ID: Sarthak Meneses is a 60 y.o. female who presents for Pre-op Exam (PAT for back surgery on 5/23/25. ).    HPI     Review of Systems   Constitutional: Negative.  Negative for appetite change and fatigue.   Eyes:  Negative for visual disturbance.   Respiratory:  Negative for chest tightness and shortness of breath.    Cardiovascular:  Negative for chest pain and leg swelling.   Gastrointestinal:  Negative for diarrhea, nausea and vomiting.   Endocrine: Negative.  Negative for polydipsia, polyphagia and polyuria.   Genitourinary:  Negative for frequency and urgency.   Musculoskeletal:  Positive for arthralgias, back pain and myalgias.   Neurological:  Negative for dizziness, syncope, weakness, numbness and headaches.   Hematological:  Does not bruise/bleed easily.       Objective   /78   Pulse 77   Temp 36.1 °C (97 °F)   Resp 15   Ht 1.575 m (5' 2\")   Wt 54.4 kg (120 lb)   SpO2 100%   BMI 21.95 kg/m²     Physical Exam  Constitutional:       Appearance: Normal appearance.   HENT:      Head: Normocephalic.      Right Ear: Tympanic membrane, ear canal and external ear normal.      Left Ear: Tympanic membrane, ear canal and external ear normal.      Nose: Nose normal.      Mouth/Throat:      Mouth: Mucous membranes are moist.      Pharynx: Oropharynx is clear.   Eyes:      Conjunctiva/sclera: Conjunctivae normal.   Cardiovascular:      Rate and Rhythm: Normal rate and regular rhythm.   Pulmonary:      Effort: Pulmonary effort is normal.      Breath sounds: Normal breath sounds.   Abdominal:      Tenderness: There is no abdominal tenderness.   Musculoskeletal:         General: Normal range of motion.      Cervical back: Neck supple.      Lumbar back: Tenderness present.   Lymphadenopathy:      Cervical: No cervical adenopathy.   Skin:     General: Skin is warm and dry.   Neurological:      General: No focal deficit present.      Mental Status: She is alert and oriented to person, " place, and time.   Psychiatric:         Mood and Affect: Mood normal.         Assessment/Plan   Problem List Items Addressed This Visit    None  Visit Diagnoses         Codes      Preoperative clearance    -  Primary Z01.818      Lumbar herniated disc     M51.26        Pt is cleared for surgery

## 2025-05-20 DIAGNOSIS — M54.16 LUMBAR RADICULOPATHY: Primary | ICD-10-CM

## 2025-05-20 RX ORDER — OXYCODONE AND ACETAMINOPHEN 5; 325 MG/1; MG/1
1 TABLET ORAL EVERY 8 HOURS PRN
Qty: 21 TABLET | Refills: 0 | Status: SHIPPED | OUTPATIENT
Start: 2025-05-23 | End: 2025-05-30

## 2025-05-23 PROCEDURE — 63030 LAMOT DCMPRN NRV RT 1 LMBR: CPT | Performed by: ORTHOPAEDIC SURGERY

## 2025-05-23 PROCEDURE — 63030 LAMOT DCMPRN NRV RT 1 LMBR: CPT | Performed by: PHYSICIAN ASSISTANT

## 2025-05-23 PROCEDURE — 64635 DESTROY LUMB/SAC FACET JNT: CPT | Performed by: ORTHOPAEDIC SURGERY

## 2025-05-27 ENCOUNTER — APPOINTMENT (OUTPATIENT)
Dept: NEUROSURGERY | Facility: CLINIC | Age: 60
End: 2025-05-27
Payer: COMMERCIAL

## 2025-06-09 ENCOUNTER — APPOINTMENT (OUTPATIENT)
Dept: ORTHOPEDIC SURGERY | Facility: CLINIC | Age: 60
End: 2025-06-09
Payer: COMMERCIAL

## 2025-06-10 ENCOUNTER — APPOINTMENT (OUTPATIENT)
Dept: ORTHOPEDIC SURGERY | Facility: CLINIC | Age: 60
End: 2025-06-10
Payer: COMMERCIAL

## 2025-06-10 DIAGNOSIS — M54.16 LUMBAR RADICULOPATHY: Primary | ICD-10-CM

## 2025-06-10 PROCEDURE — 1036F TOBACCO NON-USER: CPT | Performed by: PHYSICIAN ASSISTANT

## 2025-06-10 PROCEDURE — 99024 POSTOP FOLLOW-UP VISIT: CPT | Performed by: PHYSICIAN ASSISTANT

## 2025-06-10 NOTE — PROGRESS NOTES
Established patient follow-up she had a L5-S1 left microdiscectomy doing quite well.  She got little bit of achiness in the leg but not the pain symptoms and pain that she had before the left buttock pain completely gone.  No bowel or bladder complaints or saddle anesthesia no fever chills nausea vomiting night sweats.    Physical exam: Well-nourished, well kept.  No lymphangitis or lymphadenopathy in the examined extremities.  Ambulating with no major difficulty motor strength intact in lower extremities.  Neurologically intact.  Wounds are all clean dry and intact with no signs of redness swelling or infection.    Plan: At this point patient can come out of the LSO brace and take it easy for the next about 4 weeks.  I told her usually at 4 to 6 weeks after surgery she can increase her activity and start exercising.  Patient will follow-up with us on a as needed status

## 2025-07-31 ENCOUNTER — APPOINTMENT (OUTPATIENT)
Dept: PRIMARY CARE | Facility: CLINIC | Age: 60
End: 2025-07-31
Payer: COMMERCIAL